# Patient Record
Sex: FEMALE | Race: WHITE
[De-identification: names, ages, dates, MRNs, and addresses within clinical notes are randomized per-mention and may not be internally consistent; named-entity substitution may affect disease eponyms.]

---

## 2018-12-26 ENCOUNTER — HOSPITAL ENCOUNTER (INPATIENT)
Dept: HOSPITAL 61 - 1 WEST ICU | Age: 58
LOS: 7 days | Discharge: NURSING, REHAB OR LONG-TERM CARE | DRG: 871 | End: 2019-01-02
Payer: MEDICAID

## 2018-12-26 VITALS — DIASTOLIC BLOOD PRESSURE: 68 MMHG | SYSTOLIC BLOOD PRESSURE: 85 MMHG

## 2018-12-26 VITALS — SYSTOLIC BLOOD PRESSURE: 100 MMHG | DIASTOLIC BLOOD PRESSURE: 60 MMHG

## 2018-12-26 VITALS — DIASTOLIC BLOOD PRESSURE: 65 MMHG | SYSTOLIC BLOOD PRESSURE: 100 MMHG

## 2018-12-26 VITALS — DIASTOLIC BLOOD PRESSURE: 60 MMHG | SYSTOLIC BLOOD PRESSURE: 101 MMHG

## 2018-12-26 VITALS — BODY MASS INDEX: 22.83 KG/M2 | HEIGHT: 62 IN | WEIGHT: 124.05 LBS

## 2018-12-26 VITALS — SYSTOLIC BLOOD PRESSURE: 96 MMHG | DIASTOLIC BLOOD PRESSURE: 58 MMHG

## 2018-12-26 DIAGNOSIS — J96.22: ICD-10-CM

## 2018-12-26 DIAGNOSIS — J44.1: ICD-10-CM

## 2018-12-26 DIAGNOSIS — E87.1: ICD-10-CM

## 2018-12-26 DIAGNOSIS — F41.9: ICD-10-CM

## 2018-12-26 DIAGNOSIS — E43: ICD-10-CM

## 2018-12-26 DIAGNOSIS — J96.21: ICD-10-CM

## 2018-12-26 DIAGNOSIS — I10: ICD-10-CM

## 2018-12-26 DIAGNOSIS — Z91.19: ICD-10-CM

## 2018-12-26 DIAGNOSIS — F17.210: ICD-10-CM

## 2018-12-26 DIAGNOSIS — A41.9: Primary | ICD-10-CM

## 2018-12-26 LAB
BASE EXCESS ABG: 22 MMOL/L (ref -3–3)
HCO3 BLDA-SCNC: 55 MMOL/L (ref 21–28)
INSPIRATION SETTING TIME VENT: 45
PCO2 BLDA: 126 MMHG (ref 35–46)
PO2 BLDA: 77 MMHG (ref 65–108)
SAO2 % BLDA: 94 % (ref 92–99)

## 2018-12-26 PROCEDURE — 80053 COMPREHEN METABOLIC PANEL: CPT

## 2018-12-26 PROCEDURE — 87493 C DIFF AMPLIFIED PROBE: CPT

## 2018-12-26 PROCEDURE — 87641 MR-STAPH DNA AMP PROBE: CPT

## 2018-12-26 PROCEDURE — 36415 COLL VENOUS BLD VENIPUNCTURE: CPT

## 2018-12-26 PROCEDURE — 94660 CPAP INITIATION&MGMT: CPT

## 2018-12-26 PROCEDURE — 93005 ELECTROCARDIOGRAM TRACING: CPT

## 2018-12-26 PROCEDURE — 36600 WITHDRAWAL OF ARTERIAL BLOOD: CPT

## 2018-12-26 PROCEDURE — 99406 BEHAV CHNG SMOKING 3-10 MIN: CPT

## 2018-12-26 PROCEDURE — 83735 ASSAY OF MAGNESIUM: CPT

## 2018-12-26 PROCEDURE — 94640 AIRWAY INHALATION TREATMENT: CPT

## 2018-12-26 PROCEDURE — 82805 BLOOD GASES W/O2 SATURATION: CPT

## 2018-12-26 PROCEDURE — 71045 X-RAY EXAM CHEST 1 VIEW: CPT

## 2018-12-26 PROCEDURE — 94760 N-INVAS EAR/PLS OXIMETRY 1: CPT

## 2018-12-26 PROCEDURE — 85027 COMPLETE CBC AUTOMATED: CPT

## 2018-12-26 PROCEDURE — 5A09357 ASSISTANCE WITH RESPIRATORY VENTILATION, LESS THAN 24 CONSECUTIVE HOURS, CONTINUOUS POSITIVE AIRWAY PRESSURE: ICD-10-PCS

## 2018-12-26 PROCEDURE — 71250 CT THORAX DX C-: CPT

## 2018-12-26 RX ADMIN — METHYLPREDNISOLONE SODIUM SUCCINATE SCH MG: 40 INJECTION, POWDER, FOR SOLUTION INTRAMUSCULAR; INTRAVENOUS at 22:44

## 2018-12-26 RX ADMIN — HEPARIN SODIUM SCH UNIT: 5000 INJECTION, SOLUTION INTRAVENOUS; SUBCUTANEOUS at 22:45

## 2018-12-26 RX ADMIN — IPRATROPIUM BROMIDE AND ALBUTEROL SULFATE SCH ML: .5; 3 SOLUTION RESPIRATORY (INHALATION) at 23:00

## 2018-12-26 NOTE — NUR
Patient admitted to room 109 from [facility] at 2135.  Patient on 3LNC.  Moved self from EMS gurney 
to bed.  Monitor on, ST noted.  VSS.  Lung sounds with wheezing throughout.  Bipap 18/8, 24, 
45% placed on patient per order.  No c/o pain or discomfort at this time.  Call light is 
within reach.  Will monitor.

## 2018-12-26 NOTE — NUR
ABG obtained per Dr. Linda's order.  Dr. Llanes consulted and notified of ABG results and 
current Bipap settings.  An order was received to decrease the FIO2 to 40%.  On admission, 
patient was voicing her wish to not be intubated.  Dr. Llanes aware.  Will monitor.

## 2018-12-27 VITALS — SYSTOLIC BLOOD PRESSURE: 119 MMHG | DIASTOLIC BLOOD PRESSURE: 63 MMHG

## 2018-12-27 VITALS — DIASTOLIC BLOOD PRESSURE: 65 MMHG | SYSTOLIC BLOOD PRESSURE: 98 MMHG

## 2018-12-27 VITALS — SYSTOLIC BLOOD PRESSURE: 98 MMHG | DIASTOLIC BLOOD PRESSURE: 64 MMHG

## 2018-12-27 VITALS — SYSTOLIC BLOOD PRESSURE: 132 MMHG | DIASTOLIC BLOOD PRESSURE: 75 MMHG

## 2018-12-27 VITALS — SYSTOLIC BLOOD PRESSURE: 161 MMHG | DIASTOLIC BLOOD PRESSURE: 95 MMHG

## 2018-12-27 VITALS — SYSTOLIC BLOOD PRESSURE: 143 MMHG | DIASTOLIC BLOOD PRESSURE: 78 MMHG

## 2018-12-27 VITALS — SYSTOLIC BLOOD PRESSURE: 93 MMHG | DIASTOLIC BLOOD PRESSURE: 59 MMHG

## 2018-12-27 VITALS — DIASTOLIC BLOOD PRESSURE: 66 MMHG | SYSTOLIC BLOOD PRESSURE: 133 MMHG

## 2018-12-27 VITALS — SYSTOLIC BLOOD PRESSURE: 105 MMHG | DIASTOLIC BLOOD PRESSURE: 63 MMHG

## 2018-12-27 VITALS — DIASTOLIC BLOOD PRESSURE: 83 MMHG | SYSTOLIC BLOOD PRESSURE: 120 MMHG

## 2018-12-27 VITALS — DIASTOLIC BLOOD PRESSURE: 57 MMHG | SYSTOLIC BLOOD PRESSURE: 94 MMHG

## 2018-12-27 VITALS — SYSTOLIC BLOOD PRESSURE: 134 MMHG | DIASTOLIC BLOOD PRESSURE: 80 MMHG

## 2018-12-27 VITALS — DIASTOLIC BLOOD PRESSURE: 59 MMHG | SYSTOLIC BLOOD PRESSURE: 90 MMHG

## 2018-12-27 LAB
ALBUMIN SERPL-MCNC: 2.8 G/DL (ref 3.4–5)
ALBUMIN/GLOB SERPL: 0.8 {RATIO} (ref 1–1.7)
ALP SERPL-CCNC: 83 U/L (ref 46–116)
ALT SERPL-CCNC: 30 U/L (ref 14–59)
ANION GAP SERPL CALC-SCNC: (no result) MMOL/L (ref 6–14)
AST SERPL-CCNC: 18 U/L (ref 15–37)
BASE EXCESS ABG: 14 MMOL/L (ref -3–3)
BASE EXCESS ABG: 17 MMOL/L (ref -3–3)
BASE EXCESS ABG: 24 MMOL/L (ref -3–3)
BILIRUB SERPL-MCNC: 0.1 MG/DL (ref 0.2–1)
BUN SERPL-MCNC: 13 MG/DL (ref 7–20)
BUN/CREAT SERPL: 22 (ref 6–20)
CALCIUM SERPL-MCNC: 8.7 MG/DL (ref 8.5–10.1)
CHLORIDE SERPL-SCNC: 90 MMOL/L (ref 98–107)
CO2 SERPL-SCNC: > 45 MMOL/L (ref 21–32)
CREAT SERPL-MCNC: 0.6 MG/DL (ref 0.6–1)
ERYTHROCYTE [DISTWIDTH] IN BLOOD BY AUTOMATED COUNT: 13.8 % (ref 11.5–14.5)
GFR SERPLBLD BASED ON 1.73 SQ M-ARVRAT: 102.3 ML/MIN
GLOBULIN SER-MCNC: 3.6 G/DL (ref 2.2–3.8)
GLUCOSE SERPL-MCNC: 241 MG/DL (ref 70–99)
HCO3 BLDA-SCNC: 44 MMOL/L (ref 21–28)
HCO3 BLDA-SCNC: 48 MMOL/L (ref 21–28)
HCO3 BLDA-SCNC: 56 MMOL/L (ref 21–28)
HCT VFR BLD CALC: 35.5 % (ref 36–47)
HGB BLD-MCNC: 11.8 G/DL (ref 12–15.5)
INSPIRATION SETTING TIME VENT: 35
INSPIRATION SETTING TIME VENT: 40
INSPIRATION SETTING TIME VENT: 40
MCH RBC QN AUTO: 31 PG (ref 25–35)
MCHC RBC AUTO-ENTMCNC: 33 G/DL (ref 31–37)
MCV RBC AUTO: 92 FL (ref 79–100)
PCO2 BLDA: 100 MMHG (ref 35–46)
PCO2 BLDA: 117 MMHG (ref 35–46)
PCO2 BLDA: 83 MMHG (ref 35–46)
PLATELET # BLD AUTO: 276 X10^3/UL (ref 140–400)
PO2 BLDA: 51 MMHG (ref 65–108)
PO2 BLDA: 73 MMHG (ref 65–108)
PO2 BLDA: 86 MMHG (ref 65–108)
POTASSIUM SERPL-SCNC: 4.9 MMOL/L (ref 3.5–5.1)
PROT SERPL-MCNC: 6.4 G/DL (ref 6.4–8.2)
RBC # BLD AUTO: 3.86 X10^6/UL (ref 3.5–5.4)
SAO2 % BLDA: 84 % (ref 92–99)
SAO2 % BLDA: 94 % (ref 92–99)
SAO2 % BLDA: 97 % (ref 92–99)
SODIUM SERPL-SCNC: 134 MMOL/L (ref 136–145)
WBC # BLD AUTO: 9.7 X10^3/UL (ref 4–11)

## 2018-12-27 PROCEDURE — 5A09357 ASSISTANCE WITH RESPIRATORY VENTILATION, LESS THAN 24 CONSECUTIVE HOURS, CONTINUOUS POSITIVE AIRWAY PRESSURE: ICD-10-PCS

## 2018-12-27 RX ADMIN — METHYLPREDNISOLONE SODIUM SUCCINATE SCH MG: 40 INJECTION, POWDER, FOR SOLUTION INTRAMUSCULAR; INTRAVENOUS at 15:58

## 2018-12-27 RX ADMIN — IPRATROPIUM BROMIDE AND ALBUTEROL SULFATE SCH ML: .5; 3 SOLUTION RESPIRATORY (INHALATION) at 20:13

## 2018-12-27 RX ADMIN — IPRATROPIUM BROMIDE AND ALBUTEROL SULFATE SCH ML: .5; 3 SOLUTION RESPIRATORY (INHALATION) at 12:00

## 2018-12-27 RX ADMIN — HEPARIN SODIUM SCH UNIT: 5000 INJECTION, SOLUTION INTRAVENOUS; SUBCUTANEOUS at 15:59

## 2018-12-27 RX ADMIN — METHYLPREDNISOLONE SODIUM SUCCINATE SCH MG: 40 INJECTION, POWDER, FOR SOLUTION INTRAMUSCULAR; INTRAVENOUS at 22:00

## 2018-12-27 RX ADMIN — IPRATROPIUM BROMIDE AND ALBUTEROL SULFATE SCH ML: .5; 3 SOLUTION RESPIRATORY (INHALATION) at 15:24

## 2018-12-27 RX ADMIN — IPRATROPIUM BROMIDE AND ALBUTEROL SULFATE SCH ML: .5; 3 SOLUTION RESPIRATORY (INHALATION) at 07:34

## 2018-12-27 RX ADMIN — METHYLPREDNISOLONE SODIUM SUCCINATE SCH MG: 40 INJECTION, POWDER, FOR SOLUTION INTRAMUSCULAR; INTRAVENOUS at 06:41

## 2018-12-27 RX ADMIN — HEPARIN SODIUM SCH UNIT: 5000 INJECTION, SOLUTION INTRAVENOUS; SUBCUTANEOUS at 22:00

## 2018-12-27 RX ADMIN — HEPARIN SODIUM SCH UNIT: 5000 INJECTION, SOLUTION INTRAVENOUS; SUBCUTANEOUS at 06:45

## 2018-12-27 NOTE — NUR
Nursing Note

Pt only took half of given dose of Zyprexa, rest was dumped on ground, refusing any more 
medications.  Explained to pt the need to wear the BiPAP for her elevated CO2, pt denies 
this as an issue, states "No one told me that" and that "no one has given me any numbers on 
it".  Told the pt the last ABG showed a CO2 of 116, higher than the prior one because she 
kept the BiPAP off, pt states "that's not possible and I only had it off from 11 to 3", I 
further explained this issue to the pt.  Discussed the indications of not improving her 
respiratory function and the possibility of being intubated.  Was told by day shift that pt 
is OK to be intubated "If I have to", reaffirmed this with pt at this time.  Explained that 
intubation is a last resort, that her work of breathing is getting worse off BiPAP, but that 
we would wear the BiPAP and see if we can avoid intubation as BiPAP is less invasive.  Pt 
now back on BiPAP.

## 2018-12-27 NOTE — PDOC
PULMONARY PROGRESS NOTES


Vitals





Vital Signs








  Date Time  Temp Pulse Resp B/P (MAP) Pulse Ox O2 Delivery O2 Flow Rate FiO2


 


12/27/18 10:00  108 24 133/66 (88) 98 Nasal Cannula 3.0 


 


12/27/18 07:00 98.6       





 98.6       








General:  Alert


Lungs:  Other


Cardiovascular:  S1, S2


Abdomen:  Soft, Non-tender


Extremities:  No Edema


Labs





Laboratory Tests








Test


 12/26/18


22:30 12/27/18


02:12 12/27/18


07:45 12/27/18


09:51


 


O2 Saturation 94 % (92-99)  94 % (92-99)  97 % (92-99)  


 


Arterial Blood pH


 7.26


(7.35-7.45) 7.30


(7.35-7.45) 7.34


(7.35-7.45) 





 


Arterial Blood pCO2 at


Patient Temp 126 mmHg


(35-46) 100 mmHg


(35-46) 83 mmHg


(35-46) 





 


Arterial Blood pO2 at Patient


Temp 77 mmHg


() 73 mmHg


() 86 mmHg


() 





 


Arterial Blood HCO3


 55 mmol/L


(21-28) 48 mmol/L


(21-28) 44 mmol/L


(21-28) 





 


Arterial Blood Base Excess


 22 mmol/L


(-3-3) 17 mmol/L


(-3-3) 14 mmol/L


(-3-3) 





 


FiO2 45  40  40  


 


White Blood Count


 


 


 


 9.7 x10^3/uL


(4.0-11.0)


 


Red Blood Count


 


 


 


 3.86 x10^6/uL


(3.50-5.40)


 


Hemoglobin


 


 


 


 11.8 g/dL


(12.0-15.5)


 


Hematocrit


 


 


 


 35.5 %


(36.0-47.0)


 


Mean Corpuscular Volume    92 fL () 


 


Mean Corpuscular Hemoglobin    31 pg (25-35) 


 


Mean Corpuscular Hemoglobin


Concent 


 


 


 33 g/dL


(31-37)


 


Red Cell Distribution Width


 


 


 


 13.8 %


(11.5-14.5)


 


Platelet Count


 


 


 


 276 x10^3/uL


(140-400)


 


Sodium Level


 


 


 


 134 mmol/L


(136-145)


 


Potassium Level


 


 


 


 4.9 mmol/L


(3.5-5.1)


 


Chloride Level


 


 


 


 90 mmol/L


()


 


Carbon Dioxide Level


 


 


 


 > 45 mmol/L


(21-32)


 


Anion Gap     (6-14) 


 


Blood Urea Nitrogen


 


 


 


 13 mg/dL


(7-20)


 


Creatinine


 


 


 


 0.6 mg/dL


(0.6-1.0)


 


Estimated GFR


(Cockcroft-Gault) 


 


 


 102.3 





 


BUN/Creatinine Ratio    22 (6-20) 


 


Glucose Level


 


 


 


 241 mg/dL


(70-99)


 


Calcium Level


 


 


 


 8.7 mg/dL


(8.5-10.1)


 


Total Bilirubin


 


 


 


 0.1 mg/dL


(0.2-1.0)


 


Aspartate Amino Transf


(AST/SGOT) 


 


 


 18 U/L (15-37) 





 


Alanine Aminotransferase


(ALT/SGPT) 


 


 


 30 U/L (14-59) 





 


Alkaline Phosphatase


 


 


 


 83 U/L


()


 


Total Protein


 


 


 


 6.4 g/dL


(6.4-8.2)


 


Albumin


 


 


 


 2.8 g/dL


(3.4-5.0)


 


Albumin/Globulin Ratio    0.8 (1.0-1.7) 








Laboratory Tests








Test


 12/26/18


22:30 12/27/18


02:12 12/27/18


07:45 12/27/18


09:51


 


O2 Saturation 94 % (92-99)  94 % (92-99)  97 % (92-99)  


 


Arterial Blood pH


 7.26


(7.35-7.45) 7.30


(7.35-7.45) 7.34


(7.35-7.45) 





 


Arterial Blood pCO2 at


Patient Temp 126 mmHg


(35-46) 100 mmHg


(35-46) 83 mmHg


(35-46) 





 


Arterial Blood pO2 at Patient


Temp 77 mmHg


() 73 mmHg


() 86 mmHg


() 





 


Arterial Blood HCO3


 55 mmol/L


(21-28) 48 mmol/L


(21-28) 44 mmol/L


(21-28) 





 


Arterial Blood Base Excess


 22 mmol/L


(-3-3) 17 mmol/L


(-3-3) 14 mmol/L


(-3-3) 





 


FiO2 45  40  40  


 


White Blood Count


 


 


 


 9.7 x10^3/uL


(4.0-11.0)


 


Red Blood Count


 


 


 


 3.86 x10^6/uL


(3.50-5.40)


 


Hemoglobin


 


 


 


 11.8 g/dL


(12.0-15.5)


 


Hematocrit


 


 


 


 35.5 %


(36.0-47.0)


 


Mean Corpuscular Volume    92 fL () 


 


Mean Corpuscular Hemoglobin    31 pg (25-35) 


 


Mean Corpuscular Hemoglobin


Concent 


 


 


 33 g/dL


(31-37)


 


Red Cell Distribution Width


 


 


 


 13.8 %


(11.5-14.5)


 


Platelet Count


 


 


 


 276 x10^3/uL


(140-400)


 


Sodium Level


 


 


 


 134 mmol/L


(136-145)


 


Potassium Level


 


 


 


 4.9 mmol/L


(3.5-5.1)


 


Chloride Level


 


 


 


 90 mmol/L


()


 


Carbon Dioxide Level


 


 


 


 > 45 mmol/L


(21-32)


 


Anion Gap     (6-14) 


 


Blood Urea Nitrogen


 


 


 


 13 mg/dL


(7-20)


 


Creatinine


 


 


 


 0.6 mg/dL


(0.6-1.0)


 


Estimated GFR


(Cockcroft-Gault) 


 


 


 102.3 





 


BUN/Creatinine Ratio    22 (6-20) 


 


Glucose Level


 


 


 


 241 mg/dL


(70-99)


 


Calcium Level


 


 


 


 8.7 mg/dL


(8.5-10.1)


 


Total Bilirubin


 


 


 


 0.1 mg/dL


(0.2-1.0)


 


Aspartate Amino Transf


(AST/SGOT) 


 


 


 18 U/L (15-37) 





 


Alanine Aminotransferase


(ALT/SGPT) 


 


 


 30 U/L (14-59) 





 


Alkaline Phosphatase


 


 


 


 83 U/L


()


 


Total Protein


 


 


 


 6.4 g/dL


(6.4-8.2)


 


Albumin


 


 


 


 2.8 g/dL


(3.4-5.0)


 


Albumin/Globulin Ratio    0.8 (1.0-1.7) 








Medications





Active Scripts








 Medications  Dose


 Route/Sig


 Max Daily Dose Days Date Category


 


 Protonix


  (Pantoprazole


 Sodium) 20 Mg


 Tablet.dr  40 Mg


 PO DAILY


   12/26/18 Reported


 


 Xopenex


  (Levalbuterol


 Hcl) 1.25 Mg/3 Ml


 Vial.neb  1 Vial


 NEB QID PRN


   12/26/18 Reported


 


 Amlodipine


 Besylate 2.5 Mg


 Tablet  2.5 Mg


 PO DAILY


   12/26/18 Reported











Impression


.


A/C HYPERCAPNIA


ANXIETY AECOPD


SEE ORDERS


 THANKS











RAYO ROBERT MD Dec 27, 2018 11:51

## 2018-12-27 NOTE — RAD
Portable chest, 12/27/2018:

 

HISTORY: Respiratory distress

 

Comparison is made to a study from 11/5/2018. The patient is rotated to 

the right. The heart size is normal. There is calcific plaquing of the 

aorta. There is unchanged prominence of the pulmonary markings, probably 

due to scarring. No new pulmonary abnormality is seen. There is no 

evidence of pleural fluid.

 

IMPRESSION:

1. Unchanged prominence of the pulmonary markings compatible with 

fibrosis.

2. No new cardiopulmonary abnormality is detected.

 

Electronically signed by: Rick Moritz, MD (12/27/2018 7:40 AM) [facility]

## 2018-12-27 NOTE — HP
ADMIT DATE:  12/26/2018



HISTORY OF PRESENT ILLNESS:  The patient is a 58-year-old  female

patient who was transferred from [facility] where she came to the

Emergency Room with shortness of breath.  The patient's daughter called

emergency medical service personnel and she reported to have an oxygen

saturation of only 68% at home.  The emergency medical service personnel found

her to be at 90% on 6 liters.  She is supposed to be on oxygen.  She was

discharged from [facility] on Sunday, 2 days ago.  She apparently

did not fill her prescription due to holiday.  Her daughter stated that only

discharge medication was for hypertension.  After the patient got home, she was

doing okay on her 3 liters of oxygen and on the night of admission to [facility], she started increasing her oxygen demand and she was also noted to be

more confused as to whether the patient's ankle swelling is worse or the same as

usual.  She denied any chills, rigors, or fever.  Denied any chest pain.  She

was extensively investigated in the Emergency Room.  She had labs showed that

her white cell count of 15,800.  Her chemistry showed that she has hyponatremia

and her chest x-ray showed lungs that are free of infiltrate, heart is upper

limit of normal.  There is no effusion.  There has been mild improvement

compared to Saturday, it is a study from 11/25/2018.  The patient was admitted

with diagnosis of COPD exacerbation.  Unfortunately, they did not do any blood

gases.  By the time I saw her, her oxygen saturation was 97% on 2 liters of

oxygen by nasal cannula; however, she has prominent flapping tremors and we did

blood gases, which showed that her pH was 7.31, pCO2 of 95, pO2 was 83,

bicarbonate of 48 and oxygen saturation was 94% on FiO2 of 32%.  I have had a

lengthy discussion with her as to where do we go further from here because she

has to be on BiPAP machine and she might eventually ended up being intubated. 

Otherwise, her life expectancy is going to be very short.  She did agree to go

on BiPAP for a short period of time, then she discontinued it.  Her daughter

said that she would like her to be in a larger hospital with more specialists

and therefore we transferred her to the ICU of [facility] to

consult the pulmonologist.  In fact, by the time she arrived here last night,

her blood gases actually were worse, her pH was 7.26, pCO2 of 126, pO2 of 72,

bicarbonate was 55 and oxygen saturation was 94% on FiO2 of 45%.



PAST MEDICAL HISTORY:  Significant for chronic obstructive pulmonary disease,

chronic hypercapnic hypoxic respiratory failure, pulmonary hypertension, tobacco

use disorder, questionable borderline personality disorder, severe debility as

well as hypertension and noncompliance.



PAST SURGICAL HISTORY:  Unremarkable.



ALLERGIES:  THE PATIENT CLAIMS THAT SHE HAS MULTIPLE ALLERGIES INCLUDING ALLERGY

TO SULFA, CEFACLOR, CIPROFLOXACIN, DOXYCYCLINE, ERYTHROMYCIN, IODINE,

LISINOPRIL, METRONIDAZOLE AND TETRACYCLINE.



FAMILY HISTORY:  Unremarkable.



SOCIAL HISTORY:  She lives at home with her daughter.  She continues to smoke. 

She apparently smokes a pack a day, although she claims she smokes only few

cigarettes a day.  She does not drink alcohol or do any recreational drugs.  She

has been admitted numerous times; continue all this to be noncompliant with

medical advice.



REVIEW OF SYSTEMS:  As per history of present illness.



MEDICATIONS:  She was transferred from [facility] to continue on

following medications, she is on Xopenex 1.25 mg/3 mL by nebulizer every 4

hours, amlodipine 2.5 mg daily and Protonix 40 mg daily.



PHYSICAL EXAMINATION:

GENERAL:  On examining her today, she was resting slightly propped up in bed, in

no apparent distress.  The nursing staff stated that she became extremely short

of breath when she goes frequently to the bathroom, to use a bedpan and whenever

she tries to attempt to drop down her pants, she become extremely short of

breath and therefore, they took her pants off to preserve her energy.  There was

no pallor, jaundice, cyanosis, or thyromegaly.  No jugular venous distension. 

No lower limb edema.

VITAL SIGNS:  Her heart rate was 95, blood pressure was 90/59, temperature was

98.6, respiratory rate was 24, and oxygen saturation was 98% on 3 liters of

oxygen by nasal cannula.

HEAD, EYES, EARS, NOSE AND THROAT:  Showed normocephalic, atraumatic.

NECK:  Supple.

HEART:  Showed normal first and second heart sounds.  No gallop, rub or murmur.

CHEST:  Showed central trachea, equally reduced expansion, reduced air entry,

vesicular breath sounds, very few scattered rhonchi, could not appreciate any

crepitation.

ABDOMEN:  Slightly distended, soft, nontender.

NEUROLOGIC:  She is awake, alert.  All her cranial nerves intact.  She moves

extremities without difficulty.



LABORATORY DATA:  Her most recent blood gases as of this morning showed that her

pH was 7.34, pCO2 of 83, pO2 of 86, bicarbonate 44, and oxygen saturation was

97% on FiO2 of 40%.



ASSESSMENT AND PLAN:  My plan is to consult the pulmonologist to device a

strategy to help this patient who is extremely noncompliant.  I do not know

whether perhaps a tracheostomy and being on a ventilator is other available

solution as the patient has been admitted in and out of the hospital with acute

hypercapnic hypoxic respiratory failure on numerous occasions.  She does not

want to be intubated, although her daughter want her to be intubated if need be.

 I spoke with the Nadege Molina to discuss with the family to see exactly what

is a long-term goal of treatment.

 



______________________________

ALEAH KELLY MD



DR:  ALANNA/katharine  JOB#:  [id number] / [id number]

DD:  12/27/2018 10:21  DT:  12/27/2018 11:43

## 2018-12-27 NOTE — PDOC2
PALLIATIVE CARE


Palliative Care Note


Palliative Care 


Consult requested by Dr. Linda to address goals of care


Medical assessment per medical record; 


A/C Hypercapnia; anxiety, End stage COPD


Patient is not able to clearly verbalize her medical condition. Believes the 

green oxygen tubing is what is causing her difficulty in breathing. 


Spoke with daughter Lolly--youngest daughter.  Reviewed current medical 

condition.  Family has been trying to obtain POA but patient is inconsistent 

about who she would want to make her decisions.


Discussed option for [facility].  





Dr. Llanes did discuss with patient and Lolly  her wishes for intubation.   

Patient would want to be intubated.  Decision was made to intubate if needed 

but would be followed with more discussion and likely withdraw ventilator if 

patient was not able to be extubated. 





Lolly is trying to reach her sister and Aunt for 1700 meeting today. 


1700 family meeting cancelled per Lolly.  Felt that they had information needed.











STEPHEN SANTIAGO Dec 27, 2018 12:35

## 2018-12-27 NOTE — RAD
Examination: CT CHEST WO CONTRAST

 

History: SOA, ILD, NO PRIORS

 

Comparison/Correlation: 12/27/2018 AP view of the chest

 

Findings: Axial images of the chest were obtained without contrast. 

Sagittal and coronal reformatted images provided.

 

Relatively narrowed right and left main bronchi are noted diffusely. 

Narrowing of the right lower lobe bronchus is also seen.

 

Emphysematous involvement of the lung fields is noted. Linear scarring or 

atelectasis involving posterior right mid thoracic level in basilar region

noted. Linear atelectasis or scarring involving the left lung base also is

present along the major fissure.

 

Visualized upper abdomen is unremarkable.

 

Impression:

Linear scarring or atelectasis involving the posterior right lung basilar 

region.

 

Diffuse emphysematous involvement of the lung fields.

 

No suspicious consolidations. No suspicious interstitial process.

 

Electronically signed by: Adam Leija MD (12/27/2018 3:49 PM) [id number]

## 2018-12-28 VITALS — DIASTOLIC BLOOD PRESSURE: 88 MMHG | SYSTOLIC BLOOD PRESSURE: 136 MMHG

## 2018-12-28 VITALS — SYSTOLIC BLOOD PRESSURE: 112 MMHG | DIASTOLIC BLOOD PRESSURE: 67 MMHG

## 2018-12-28 VITALS — DIASTOLIC BLOOD PRESSURE: 92 MMHG | SYSTOLIC BLOOD PRESSURE: 149 MMHG

## 2018-12-28 VITALS — SYSTOLIC BLOOD PRESSURE: 139 MMHG | DIASTOLIC BLOOD PRESSURE: 82 MMHG

## 2018-12-28 VITALS — SYSTOLIC BLOOD PRESSURE: 141 MMHG | DIASTOLIC BLOOD PRESSURE: 81 MMHG

## 2018-12-28 VITALS — SYSTOLIC BLOOD PRESSURE: 162 MMHG | DIASTOLIC BLOOD PRESSURE: 87 MMHG

## 2018-12-28 VITALS — SYSTOLIC BLOOD PRESSURE: 126 MMHG | DIASTOLIC BLOOD PRESSURE: 76 MMHG

## 2018-12-28 VITALS — SYSTOLIC BLOOD PRESSURE: 139 MMHG | DIASTOLIC BLOOD PRESSURE: 85 MMHG

## 2018-12-28 VITALS — DIASTOLIC BLOOD PRESSURE: 83 MMHG | SYSTOLIC BLOOD PRESSURE: 152 MMHG

## 2018-12-28 LAB
BASE EXCESS ABG: 20 MMOL/L (ref -3–3)
HCO3 BLDA-SCNC: 49 MMOL/L (ref 21–28)
INSPIRATION SETTING TIME VENT: 40
PCO2 BLDA: 87 MMHG (ref 35–46)
PO2 BLDA: 81 MMHG (ref 65–108)
SAO2 % BLDA: 95 % (ref 92–99)

## 2018-12-28 PROCEDURE — 5A09357 ASSISTANCE WITH RESPIRATORY VENTILATION, LESS THAN 24 CONSECUTIVE HOURS, CONTINUOUS POSITIVE AIRWAY PRESSURE: ICD-10-PCS

## 2018-12-28 RX ADMIN — METHYLPREDNISOLONE SODIUM SUCCINATE SCH MG: 40 INJECTION, POWDER, FOR SOLUTION INTRAMUSCULAR; INTRAVENOUS at 20:54

## 2018-12-28 RX ADMIN — METHYLPREDNISOLONE SODIUM SUCCINATE SCH MG: 40 INJECTION, POWDER, FOR SOLUTION INTRAMUSCULAR; INTRAVENOUS at 14:03

## 2018-12-28 RX ADMIN — HEPARIN SODIUM SCH UNIT: 5000 INJECTION, SOLUTION INTRAVENOUS; SUBCUTANEOUS at 20:55

## 2018-12-28 RX ADMIN — IPRATROPIUM BROMIDE AND ALBUTEROL SULFATE SCH ML: .5; 3 SOLUTION RESPIRATORY (INHALATION) at 11:50

## 2018-12-28 RX ADMIN — METHYLPREDNISOLONE SODIUM SUCCINATE SCH MG: 40 INJECTION, POWDER, FOR SOLUTION INTRAMUSCULAR; INTRAVENOUS at 06:00

## 2018-12-28 RX ADMIN — HEPARIN SODIUM SCH UNIT: 5000 INJECTION, SOLUTION INTRAVENOUS; SUBCUTANEOUS at 06:00

## 2018-12-28 RX ADMIN — IPRATROPIUM BROMIDE AND ALBUTEROL SULFATE SCH ML: .5; 3 SOLUTION RESPIRATORY (INHALATION) at 15:52

## 2018-12-28 RX ADMIN — HEPARIN SODIUM SCH UNIT: 5000 INJECTION, SOLUTION INTRAVENOUS; SUBCUTANEOUS at 14:00

## 2018-12-28 RX ADMIN — IPRATROPIUM BROMIDE AND ALBUTEROL SULFATE SCH ML: .5; 3 SOLUTION RESPIRATORY (INHALATION) at 07:38

## 2018-12-28 RX ADMIN — IPRATROPIUM BROMIDE AND ALBUTEROL SULFATE SCH ML: .5; 3 SOLUTION RESPIRATORY (INHALATION) at 20:09

## 2018-12-28 NOTE — PDOC2
PALLIATIVE CARE


Palliative Care Note


Palliative Care


Patient asleep.  On BiPap


No family at bedside.  pCO2 80's. 





Code Status:  Full Code. 





Will follow as needed.











STEPHEN SANTIAGO Dec 28, 2018 14:55

## 2018-12-28 NOTE — CONS
DATE OF CONSULTATION:  12/27/2018



ATTENDING PHYSICIAN:  Dr. Linda.



REASON FOR CONSULTATION:  The patient seen in pulmonary consultation at the

request of Dr. Linda for acute on chronic hypoxemic hypercapnic respiratory

failure.  Initial pH 7.26, paCO2 of 126, paO2 of 77.



HISTORY OF PRESENT ILLNESS:  The patient is a 58-year-old female known to me

from previous hospitalization with severe anxiety, severe COPD, chronic

hypercapnic respiratory failure presented with increasing shortness of breath. 

Apparently, she was at [facility] up in [place] and she was

discharged.  She continues to smoke on and off.  She has a cough, mostly

nonproductive.  She denies fever, chills, nausea, vomiting.  Her x-ray was

reviewed.  There was some chronic interstitial markings.  No new infiltrates and

consolidation.  The patient denies hemoptysis.



PAST MEDICAL HISTORY:  Chronic respiratory failure, tobacco dependence, chronic

bronchitis, severe COPD, hypertension.



PAST SURGICAL HISTORY:  Previous lung infection.  She has had thoracentesis and

possibly a parapneumonic effusion.



FAMILY HISTORY:  Unknown.



SOCIAL HISTORY:  She smokes.



REVIEW OF SYSTEMS:  As indicated above, otherwise, a 10-point system was

reviewed and negative.



CURRENT MEDICATIONS:  List was reviewed.



ALLERGIES:  SHE HAS MULTIPLE ALLERGIES, PLEASE SEE THE LIST.



PHYSICAL EXAMINATION:

VITAL SIGNS:  Stable.  She is currently on 3 liters of oxygen supplementation.

GENERAL:  Awake, alert, following commands.

HEENT:  Eyes, the sclerae were nonicteric.

NECK:  Jugular venous distention was not elevated.  No lymphadenopathy.

CHEST:  Hyperinflation, barrel chest.

LUNGS:  Poor airway flow with no wheezes.

CARDIOVASCULAR:  Regular rate and rhythm with S1, S2.  No S3.

ABDOMEN:  Soft, nontender, nondistended.

EXTREMITIES:  No clubbing, cyanosis or edema.

NEUROLOGIC:  The patient was awake, alert, following commands.  A detailed neuro

exam was not performed.



LABORATORY DATA:  Reviewed.  White count was normal.  Hemoglobin and hematocrit

were noted.



IMPRESSION:

1.  Acute on chronic hypercapnic hypoxemic respiratory failure.

2.  Acute exacerbation of chronic obstructive pulmonary disease.

3.  Tobacco dependence.

4.  Multiple allergies.

5.  Severe anxiety disorder, nonspecific.

6.  Hypertension.



PLAN:

1.  Continue current p.r.n. and at bedtime BiPAP.

2.  Decrease O2 to lowest possible saturation maintained above 88%.

3.  I had a discussion with the patient and her daughter who works at [employer]. 

The patient wishes to be intubated for a short period of time.  If she does not

improve, she wishes to be taken off mechanical support and allow natural death

after approximately 7-10 days of mechanical support.



Dr. Linda, I do appreciate the privilege in sharing in the patient's care.

 



______________________________

RAYO ROBERT MD



DR:  LEIDY/nts  JOB#:  [id number] / [id number]

DD:  12/27/2018 11:46  DT:  12/28/2018 00:06

## 2018-12-28 NOTE — PDOC
PROGRESS NOTES


Subjective


Subjective


continues to desturate when she takes her oxygen off


still believes that all her symptoms are related to allergy 


Asking if any diet can alter and improves her condition





Objective


Objective





Vital Signs








  Date Time  Temp Pulse Resp B/P (MAP) Pulse Ox O2 Delivery O2 Flow Rate FiO2


 


12/28/18 08:02     97 Nasal Cannula 3.0 


 


12/28/18 03:52 95.0 107 30 149/92 (111)    





 95.0       














Intake and Output 


 


 12/28/18





 07:01


 


Intake Total 600 ml


 


Output Total 1250 ml


 


Balance -650 ml


 


 


 


Intake Oral 600 ml


 


Output Urine Total 1250 ml


 


# Bowel Movements 2











Physical Exam


Physical Exam


Resting propped up in bed in NAD


Vital signs ate stable


chest reduced air entry few scattered rhichi


Lungs:  Other





Diagnosis


COPD:  Acute on Chronic


RESPIRATORY FAILURE:  Acute on Chronic


HYPERTENSION:  Benign hypertension





Assessment


Assessment


acute on chronic hypercapnic respiratory failure


COPD Exacerbation


Hypertension


Non compliance





Plan


Plan of Care


To continue with steroids and bronchodilators 


May eventually require intubation and perhaps tracheostomy





Comment


Review of Relevant


I have reviewed the following items tamie (where applicable) has been applied.


Labs





Laboratory Tests








Test


 12/26/18


21:45 12/26/18


22:30 12/27/18


02:12 12/27/18


07:45


 


Nasal Screen MRSA (PCR)


 Negative


(Negative) 


 


 





 


O2 Saturation  94 % (92-99)  94 % (92-99)  97 % (92-99) 


 


Arterial Blood pH


 


 7.26


(7.35-7.45) 7.30


(7.35-7.45) 7.34


(7.35-7.45)


 


Arterial Blood pCO2 at


Patient Temp 


 126 mmHg


(35-46) 100 mmHg


(35-46) 83 mmHg


(35-46)


 


Arterial Blood pO2 at Patient


Temp 


 77 mmHg


() 73 mmHg


() 86 mmHg


()


 


Arterial Blood HCO3


 


 55 mmol/L


(21-28) 48 mmol/L


(21-28) 44 mmol/L


(21-28)


 


Arterial Blood Base Excess


 


 22 mmol/L


(-3-3) 17 mmol/L


(-3-3) 14 mmol/L


(-3-3)


 


FiO2  45  40  40 


 


Test


 12/27/18


09:51 12/27/18


16:52 12/28/18


07:45 





 


White Blood Count


 9.7 x10^3/uL


(4.0-11.0) 


 


 





 


Red Blood Count


 3.86 x10^6/uL


(3.50-5.40) 


 


 





 


Hemoglobin


 11.8 g/dL


(12.0-15.5) 


 


 





 


Hematocrit


 35.5 %


(36.0-47.0) 


 


 





 


Mean Corpuscular Volume 92 fL ()    


 


Mean Corpuscular Hemoglobin 31 pg (25-35)    


 


Mean Corpuscular Hemoglobin


Concent 33 g/dL


(31-37) 


 


 





 


Red Cell Distribution Width


 13.8 %


(11.5-14.5) 


 


 





 


Platelet Count


 276 x10^3/uL


(140-400) 


 


 





 


Sodium Level


 134 mmol/L


(136-145) 


 


 





 


Potassium Level


 4.9 mmol/L


(3.5-5.1) 


 


 





 


Chloride Level


 90 mmol/L


() 


 


 





 


Carbon Dioxide Level


 > 45 mmol/L


(21-32) 


 


 





 


Anion Gap  (6-14)    


 


Blood Urea Nitrogen


 13 mg/dL


(7-20) 


 


 





 


Creatinine


 0.6 mg/dL


(0.6-1.0) 


 


 





 


Estimated GFR


(Cockcroft-Gault) 102.3 


 


 


 





 


BUN/Creatinine Ratio 22 (6-20)    


 


Glucose Level


 241 mg/dL


(70-99) 


 


 





 


Calcium Level


 8.7 mg/dL


(8.5-10.1) 


 


 





 


Total Bilirubin


 0.1 mg/dL


(0.2-1.0) 


 


 





 


Aspartate Amino Transf


(AST/SGOT) 18 U/L (15-37) 


 


 


 





 


Alanine Aminotransferase


(ALT/SGPT) 30 U/L (14-59) 


 


 


 





 


Alkaline Phosphatase


 83 U/L


() 


 


 





 


Total Protein


 6.4 g/dL


(6.4-8.2) 


 


 





 


Albumin


 2.8 g/dL


(3.4-5.0) 


 


 





 


Albumin/Globulin Ratio 0.8 (1.0-1.7)    


 


O2 Saturation  84 % (92-99)  95 % (92-99)  


 


Arterial Blood pH


 


 7.30


(7.35-7.45) 7.37


(7.35-7.45) 





 


Arterial Blood pCO2 at


Patient Temp 


 117 mmHg


(35-46) 87 mmHg


(35-46) 





 


Arterial Blood pO2 at Patient


Temp 


 51 mmHg


() 81 mmHg


() 





 


Arterial Blood HCO3


 


 56 mmol/L


(21-28) 49 mmol/L


(21-28) 





 


Arterial Blood Base Excess


 


 24 mmol/L


(-3-3) 20 mmol/L


(-3-3) 





 


FiO2  35  40  








Laboratory Tests








Test


 12/27/18


09:51 12/27/18


16:52 12/28/18


07:45


 


White Blood Count


 9.7 x10^3/uL


(4.0-11.0) 


 





 


Red Blood Count


 3.86 x10^6/uL


(3.50-5.40) 


 





 


Hemoglobin


 11.8 g/dL


(12.0-15.5) 


 





 


Hematocrit


 35.5 %


(36.0-47.0) 


 





 


Mean Corpuscular Volume 92 fL ()   


 


Mean Corpuscular Hemoglobin 31 pg (25-35)   


 


Mean Corpuscular Hemoglobin


Concent 33 g/dL


(31-37) 


 





 


Red Cell Distribution Width


 13.8 %


(11.5-14.5) 


 





 


Platelet Count


 276 x10^3/uL


(140-400) 


 





 


Sodium Level


 134 mmol/L


(136-145) 


 





 


Potassium Level


 4.9 mmol/L


(3.5-5.1) 


 





 


Chloride Level


 90 mmol/L


() 


 





 


Carbon Dioxide Level


 > 45 mmol/L


(21-32) 


 





 


Anion Gap  (6-14)   


 


Blood Urea Nitrogen


 13 mg/dL


(7-20) 


 





 


Creatinine


 0.6 mg/dL


(0.6-1.0) 


 





 


Estimated GFR


(Cockcroft-Gault) 102.3 


 


 





 


BUN/Creatinine Ratio 22 (6-20)   


 


Glucose Level


 241 mg/dL


(70-99) 


 





 


Calcium Level


 8.7 mg/dL


(8.5-10.1) 


 





 


Total Bilirubin


 0.1 mg/dL


(0.2-1.0) 


 





 


Aspartate Amino Transf


(AST/SGOT) 18 U/L (15-37) 


 


 





 


Alanine Aminotransferase


(ALT/SGPT) 30 U/L (14-59) 


 


 





 


Alkaline Phosphatase


 83 U/L


() 


 





 


Total Protein


 6.4 g/dL


(6.4-8.2) 


 





 


Albumin


 2.8 g/dL


(3.4-5.0) 


 





 


Albumin/Globulin Ratio 0.8 (1.0-1.7)   


 


O2 Saturation  84 % (92-99)  95 % (92-99) 


 


Arterial Blood pH


 


 7.30


(7.35-7.45) 7.37


(7.35-7.45)


 


Arterial Blood pCO2 at


Patient Temp 


 117 mmHg


(35-46) 87 mmHg


(35-46)


 


Arterial Blood pO2 at Patient


Temp 


 51 mmHg


() 81 mmHg


()


 


Arterial Blood HCO3


 


 56 mmol/L


(21-28) 49 mmol/L


(21-28)


 


Arterial Blood Base Excess


 


 24 mmol/L


(-3-3) 20 mmol/L


(-3-3)


 


FiO2  35  40 








Medications





Current Medications


Albuterol/ Ipratropium (Duoneb) 3 ml RTQID NEB  Last administered on 12/28/18at 

07:38;  Start 12/26/18 at 23:00


Methylprednisolone Sodium Succinate (SOLU-Medrol 40MG VIAL) 40 mg Q8HRS IV  

Last administered on 12/28/18at 06:00;  Start 12/26/18 at 22:00


Albuterol Sulfate (Ventolin Neb Soln) 2.5 mg PRN Q4HRS  PRN NEB SHORTNESS OF 

BREATH;  Start 12/26/18 at 22:00


Ondansetron HCl (Zofran) 4 mg PRN Q6HRS  PRN IV NAUSEA/VOMITING 1ST CHOICE;  

Start 12/26/18 at 22:00


Heparin Sodium (Porcine) (Heparin Sodium) 5,000 unit Q8HRS SQ  Last 

administered on 12/28/18at 06:00;  Start 12/26/18 at 22:00


Olanzapine (ZyPREXA) 2.5 mg PRN Q4HRS  PRN PO anxiety  Last administered on 12/ 28/18at 04:05;  Start 12/27/18 at 09:15


Lorazepam (Ativan) 0.5 mg PRN Q8HRS  PRN PO ANXIETY / AGITATION;  Start 12/27/ 18 at 12:00





Active Scripts


Active


Reported


Protonix (Pantoprazole Sodium) 20 Mg Tablet.dr 40 Mg PO DAILY


Xopenex (Levalbuterol Hcl) 1.25 Mg/3 Ml Vial.neb 1 Vial NEB QID PRN


Amlodipine Besylate 2.5 Mg Tablet 2.5 Mg PO DAILY


Vitals/I & O





Vital Sign - Last 24 Hours








 12/27/18 12/27/18 12/27/18 12/27/18





 09:00 10:00 12:00 12:00


 


Pulse 95 108  


 


Resp 24 24  


 


B/P (MAP) 90/59 (69) 133/66 (88)  


 


Pulse Ox 98 98  


 


O2 Delivery Nasal Cannula Nasal Cannula  Bi-pap


 


O2 Flow Rate 3.0 3.0 3.0 4.0





 12/27/18 12/27/18 12/27/18 12/27/18





 12:00 12:05 15:57 16:00


 


Temp 98.6   





 98.6   


 


Pulse 108   


 


B/P (MAP) 161/95 (117)   


 


Pulse Ox 98 91 94 


 


O2 Delivery  BiPAP/CPAP BiPAP/CPAP Bi-pap


 


O2 Flow Rate 4.0   4.0


 


    





    





 12/27/18 12/27/18 12/27/18 12/27/18





 16:00 17:05 18:42 20:00


 


Temp  98.6  





  98.6  


 


Pulse  95  


 


B/P (MAP)  133/66 (88)  


 


Pulse Ox  94 94 


 


O2 Delivery   BiPAP/CPAP Bi-pap


 


O2 Flow Rate 3.0 3.0  


 


    





    





 12/27/18 12/27/18 12/27/18 12/28/18





 20:00 20:16 23:18 00:00


 


Temp 98.5   





 98.5   


 


Pulse 104   


 


B/P (MAP) 132/75 (94)   


 


Pulse Ox 90 92 92 


 


O2 Delivery BiPAP/CPAP BiPAP/CPAP BiPAP/CPAP Bi-pap


 


O2 Flow Rate 3.0   


 


    





    





 12/28/18 12/28/18 12/28/18 12/28/18





 00:00 01:59 03:52 04:00


 


Temp 98.1  95.0 





 98.1  95.0 


 


Pulse 97  107 


 


Resp 23  30 


 


B/P (MAP) 112/67 (82)  149/92 (111) 


 


Pulse Ox 91 92 94 


 


O2 Delivery BiPAP/CPAP BiPAP/CPAP Nasal Cannula Bi-pap


 


O2 Flow Rate   6.0 


 


    





    





 12/28/18 12/28/18 12/28/18 





 05:22 07:39 08:02 


 


Pulse Ox 96 98 97 


 


O2 Delivery BiPAP/CPAP BiPAP/CPAP Nasal Cannula 


 


O2 Flow Rate   3.0 














Intake and Output   


 


 12/27/18 12/27/18 12/28/18





 15:01 23:01 07:01


 


Intake Total  0 ml 600 ml


 


Output Total  800 ml 450 ml


 


Balance  -800 ml 150 ml

















ALEAH KELLY MD Dec 28, 2018 08:35

## 2018-12-28 NOTE — PDOC
PULMONARY PROGRESS NOTES


Vitals





Vital Signs








  Date Time  Temp Pulse Resp B/P (MAP) Pulse Ox O2 Delivery O2 Flow Rate FiO2


 


12/28/18 08:02     97 Nasal Cannula 3.0 


 


12/28/18 03:52 95.0 107 30 149/92 (111)    





 95.0       








General:  Alert


Lungs:  Other


Cardiovascular:  S1, S2


Abdomen:  Soft, Non-tender


Extremities:  No Edema


Labs





Laboratory Tests








Test


 12/26/18


21:45 12/26/18


22:30 12/27/18


02:12 12/27/18


07:45


 


Nasal Screen MRSA (PCR)


 Negative


(Negative) 


 


 





 


O2 Saturation  94 % (92-99)  94 % (92-99)  97 % (92-99) 


 


Arterial Blood pH


 


 7.26


(7.35-7.45) 7.30


(7.35-7.45) 7.34


(7.35-7.45)


 


Arterial Blood pCO2 at


Patient Temp 


 126 mmHg


(35-46) 100 mmHg


(35-46) 83 mmHg


(35-46)


 


Arterial Blood pO2 at Patient


Temp 


 77 mmHg


() 73 mmHg


() 86 mmHg


()


 


Arterial Blood HCO3


 


 55 mmol/L


(21-28) 48 mmol/L


(21-28) 44 mmol/L


(21-28)


 


Arterial Blood Base Excess


 


 22 mmol/L


(-3-3) 17 mmol/L


(-3-3) 14 mmol/L


(-3-3)


 


FiO2  45  40  40 


 


Test


 12/27/18


09:51 12/27/18


16:52 12/28/18


07:45 





 


White Blood Count


 9.7 x10^3/uL


(4.0-11.0) 


 


 





 


Red Blood Count


 3.86 x10^6/uL


(3.50-5.40) 


 


 





 


Hemoglobin


 11.8 g/dL


(12.0-15.5) 


 


 





 


Hematocrit


 35.5 %


(36.0-47.0) 


 


 





 


Mean Corpuscular Volume 92 fL ()    


 


Mean Corpuscular Hemoglobin 31 pg (25-35)    


 


Mean Corpuscular Hemoglobin


Concent 33 g/dL


(31-37) 


 


 





 


Red Cell Distribution Width


 13.8 %


(11.5-14.5) 


 


 





 


Platelet Count


 276 x10^3/uL


(140-400) 


 


 





 


Sodium Level


 134 mmol/L


(136-145) 


 


 





 


Potassium Level


 4.9 mmol/L


(3.5-5.1) 


 


 





 


Chloride Level


 90 mmol/L


() 


 


 





 


Carbon Dioxide Level


 > 45 mmol/L


(21-32) 


 


 





 


Anion Gap  (6-14)    


 


Blood Urea Nitrogen


 13 mg/dL


(7-20) 


 


 





 


Creatinine


 0.6 mg/dL


(0.6-1.0) 


 


 





 


Estimated GFR


(Cockcroft-Gault) 102.3 


 


 


 





 


BUN/Creatinine Ratio 22 (6-20)    


 


Glucose Level


 241 mg/dL


(70-99) 


 


 





 


Calcium Level


 8.7 mg/dL


(8.5-10.1) 


 


 





 


Total Bilirubin


 0.1 mg/dL


(0.2-1.0) 


 


 





 


Aspartate Amino Transf


(AST/SGOT) 18 U/L (15-37) 


 


 


 





 


Alanine Aminotransferase


(ALT/SGPT) 30 U/L (14-59) 


 


 


 





 


Alkaline Phosphatase


 83 U/L


() 


 


 





 


Total Protein


 6.4 g/dL


(6.4-8.2) 


 


 





 


Albumin


 2.8 g/dL


(3.4-5.0) 


 


 





 


Albumin/Globulin Ratio 0.8 (1.0-1.7)    


 


O2 Saturation  84 % (92-99)  95 % (92-99)  


 


Arterial Blood pH


 


 7.30


(7.35-7.45) 7.37


(7.35-7.45) 





 


Arterial Blood pCO2 at


Patient Temp 


 117 mmHg


(35-46) 87 mmHg


(35-46) 





 


Arterial Blood pO2 at Patient


Temp 


 51 mmHg


() 81 mmHg


() 





 


Arterial Blood HCO3


 


 56 mmol/L


(21-28) 49 mmol/L


(21-28) 





 


Arterial Blood Base Excess


 


 24 mmol/L


(-3-3) 20 mmol/L


(-3-3) 





 


FiO2  35  40  








Laboratory Tests








Test


 12/27/18


16:52 12/28/18


07:45


 


O2 Saturation 84 % (92-99)  95 % (92-99) 


 


Arterial Blood pH


 7.30


(7.35-7.45) 7.37


(7.35-7.45)


 


Arterial Blood pCO2 at


Patient Temp 117 mmHg


(35-46) 87 mmHg


(35-46)


 


Arterial Blood pO2 at Patient


Temp 51 mmHg


() 81 mmHg


()


 


Arterial Blood HCO3


 56 mmol/L


(21-28) 49 mmol/L


(21-28)


 


Arterial Blood Base Excess


 24 mmol/L


(-3-3) 20 mmol/L


(-3-3)


 


FiO2 35  40 








Medications





Active Scripts








 Medications  Dose


 Route/Sig


 Max Daily Dose Days Date Category


 


 Protonix


  (Pantoprazole


 Sodium) 20 Mg


 Tablet.dr  40 Mg


 PO DAILY


   12/26/18 Reported


 


 Xopenex


  (Levalbuterol


 Hcl) 1.25 Mg/3 Ml


 Vial.neb  1 Vial


 NEB QID PRN


   12/26/18 Reported


 


 Amlodipine


 Besylate 2.5 Mg


 Tablet  2.5 Mg


 PO DAILY


   12/26/18 Reported











Impression


.


IMPRESSION:


1.  Acute on chronic hypercapnic hypoxemic respiratory failure.


2.  Acute exacerbation of chronic obstructive pulmonary disease.


3.  Tobacco dependence.


4.  Multiple allergies.


5.  Severe anxiety disorder, nonspecific.


6.  Hypertension.














CT CHEST


 Impression:


Linear scarring or atelectasis involving the posterior right lung basilar 


region.


 


Diffuse emphysematous involvement of the lung fields.


 


No suspicious consolidations. No suspicious interstitial process.





Plan


.





1. continue BIPAP, okay to discontinue IV feeding


2. reviewed CT Chest, no infiltrate, severe emphysema 


3. continue steroids, IV antibiotics, ABG noted better this morning











RAYO ROBERT MD Dec 28, 2018 10:28

## 2018-12-28 NOTE — NUR
SS following for discharge planning. SS reviewed pt chart. Pt is from home and on 3 liters 
of oxygen currently and has had one previous admission. Pt was previously on services with 
[facility], [phone]; fax [phone]. SS will continue to follow for 
discharge planning.

## 2018-12-29 VITALS — SYSTOLIC BLOOD PRESSURE: 128 MMHG | DIASTOLIC BLOOD PRESSURE: 100 MMHG

## 2018-12-29 VITALS — SYSTOLIC BLOOD PRESSURE: 83 MMHG | DIASTOLIC BLOOD PRESSURE: 65 MMHG

## 2018-12-29 VITALS — DIASTOLIC BLOOD PRESSURE: 76 MMHG | SYSTOLIC BLOOD PRESSURE: 116 MMHG

## 2018-12-29 VITALS — SYSTOLIC BLOOD PRESSURE: 107 MMHG | DIASTOLIC BLOOD PRESSURE: 95 MMHG

## 2018-12-29 VITALS — SYSTOLIC BLOOD PRESSURE: 124 MMHG | DIASTOLIC BLOOD PRESSURE: 70 MMHG

## 2018-12-29 VITALS — SYSTOLIC BLOOD PRESSURE: 105 MMHG | DIASTOLIC BLOOD PRESSURE: 73 MMHG

## 2018-12-29 VITALS — DIASTOLIC BLOOD PRESSURE: 72 MMHG | SYSTOLIC BLOOD PRESSURE: 108 MMHG

## 2018-12-29 VITALS — SYSTOLIC BLOOD PRESSURE: 99 MMHG | DIASTOLIC BLOOD PRESSURE: 73 MMHG

## 2018-12-29 VITALS — SYSTOLIC BLOOD PRESSURE: 164 MMHG | DIASTOLIC BLOOD PRESSURE: 95 MMHG

## 2018-12-29 VITALS — SYSTOLIC BLOOD PRESSURE: 93 MMHG | DIASTOLIC BLOOD PRESSURE: 58 MMHG

## 2018-12-29 VITALS — DIASTOLIC BLOOD PRESSURE: 73 MMHG | SYSTOLIC BLOOD PRESSURE: 115 MMHG

## 2018-12-29 VITALS — DIASTOLIC BLOOD PRESSURE: 90 MMHG | SYSTOLIC BLOOD PRESSURE: 141 MMHG

## 2018-12-29 VITALS — DIASTOLIC BLOOD PRESSURE: 67 MMHG | SYSTOLIC BLOOD PRESSURE: 85 MMHG

## 2018-12-29 VITALS — SYSTOLIC BLOOD PRESSURE: 109 MMHG | DIASTOLIC BLOOD PRESSURE: 67 MMHG

## 2018-12-29 VITALS — DIASTOLIC BLOOD PRESSURE: 97 MMHG | SYSTOLIC BLOOD PRESSURE: 120 MMHG

## 2018-12-29 VITALS — DIASTOLIC BLOOD PRESSURE: 61 MMHG | SYSTOLIC BLOOD PRESSURE: 96 MMHG

## 2018-12-29 LAB
BASE EXCESS ABG: 21 MMOL/L (ref -3–3)
BASE EXCESS STD BLDA CALC-SCNC: 23 MMOL/L (ref -3–3)
HCO3 BLDA-SCNC: 51 MMOL/L (ref 21–28)
HCO3 BLDA-SCNC: 60 MMOL/L (ref 21–28)
INSPIRATION SETTING TIME VENT: 30
METHGB MFR BLD: 0.6 % (ref 0–1.9)
OXYHGB MFR BLD: 93.7 %
PCO2 BLDA: 88 MMHG (ref 35–46)
PCO2 BLDA: > 155 MMHG (ref 35–46)
PO2 BLDA: 66 MMHG (ref 65–108)
PO2 BLDA: 92 MMHG (ref 65–108)
SAO2 % BLDA: 93 % (ref 92–99)
SAO2 % BLDA: 95 % (ref 92–99)

## 2018-12-29 PROCEDURE — 5A09357 ASSISTANCE WITH RESPIRATORY VENTILATION, LESS THAN 24 CONSECUTIVE HOURS, CONTINUOUS POSITIVE AIRWAY PRESSURE: ICD-10-PCS

## 2018-12-29 RX ADMIN — HEPARIN SODIUM SCH UNIT: 5000 INJECTION, SOLUTION INTRAVENOUS; SUBCUTANEOUS at 14:00

## 2018-12-29 RX ADMIN — IPRATROPIUM BROMIDE AND ALBUTEROL SULFATE SCH ML: .5; 3 SOLUTION RESPIRATORY (INHALATION) at 16:32

## 2018-12-29 RX ADMIN — METHYLPREDNISOLONE SODIUM SUCCINATE SCH MG: 40 INJECTION, POWDER, FOR SOLUTION INTRAMUSCULAR; INTRAVENOUS at 06:05

## 2018-12-29 RX ADMIN — HEPARIN SODIUM SCH UNIT: 5000 INJECTION, SOLUTION INTRAVENOUS; SUBCUTANEOUS at 22:00

## 2018-12-29 RX ADMIN — HEPARIN SODIUM SCH UNIT: 5000 INJECTION, SOLUTION INTRAVENOUS; SUBCUTANEOUS at 06:00

## 2018-12-29 RX ADMIN — IPRATROPIUM BROMIDE AND ALBUTEROL SULFATE SCH ML: .5; 3 SOLUTION RESPIRATORY (INHALATION) at 13:21

## 2018-12-29 RX ADMIN — IPRATROPIUM BROMIDE AND ALBUTEROL SULFATE SCH ML: .5; 3 SOLUTION RESPIRATORY (INHALATION) at 19:42

## 2018-12-29 RX ADMIN — METHYLPREDNISOLONE SODIUM SUCCINATE SCH MG: 40 INJECTION, POWDER, FOR SOLUTION INTRAMUSCULAR; INTRAVENOUS at 22:17

## 2018-12-29 RX ADMIN — METHYLPREDNISOLONE SODIUM SUCCINATE SCH MG: 40 INJECTION, POWDER, FOR SOLUTION INTRAMUSCULAR; INTRAVENOUS at 17:15

## 2018-12-29 RX ADMIN — IPRATROPIUM BROMIDE AND ALBUTEROL SULFATE SCH ML: .5; 3 SOLUTION RESPIRATORY (INHALATION) at 08:24

## 2018-12-29 NOTE — PDOC
PULMONARY PROGRESS NOTES


Subjective


PT ON BIPAP


OFF BIPAP DID NOT DO WELL ON NC


Vitals





Vital Signs








  Date Time  Temp Pulse Resp B/P (MAP) Pulse Ox O2 Delivery O2 Flow Rate FiO2


 


12/29/18 13:21     93 BiPAP/CPAP  


 


12/29/18 13:00  110 22 116/76 (89)    


 


12/29/18 12:00       3.5 


 


12/29/18 09:00 98.2       





 98.2       








General:  Alert


Lungs:  Clear


Cardiovascular:  S1, S2


Abdomen:  Soft, Non-tender


Neuro Exam:  Alert


Extremities:  No Edema


Skin:  Warm


Labs





Laboratory Tests








Test


 12/27/18


16:52 12/28/18


07:45 12/29/18


08:25 12/29/18


13:20


 


O2 Saturation 84 % (92-99)  95 % (92-99)  95 % (92-99)  93 % (92-99) 


 


Arterial Blood pH


 7.30


(7.35-7.45) 7.37


(7.35-7.45) 7.20


(7.35-7.45) 7.38


(7.35-7.45)


 


Arterial Blood pCO2 at


Patient Temp 117 mmHg


(35-46) 87 mmHg


(35-46) > 155 mmHg


(35-46) 88 mmHg


(35-46)


 


Arterial Blood pO2 at Patient


Temp 51 mmHg


() 81 mmHg


() 92 mmHg


() 66 mmHg


()


 


Arterial Blood HCO3


 56 mmol/L


(21-28) 49 mmol/L


(21-28) 60 mmol/L


(21-28) 51 mmol/L


(21-28)


 


Arterial Blood Base Excess


 24 mmol/L


(-3-3) 20 mmol/L


(-3-3) 23 mmol/L


(-3-3) 21 mmol/L


(-3-3)


 


FiO2 35  40  36  30 


 


Oxyhemoglobin   93.7 %  


 


Methemoglobin


 


 


 0.6 %


(0.0-1.9) 





 


Carbon Monoxide, Quantitative


 


 


 0.8 %


(0.0-1.9) 











Laboratory Tests








Test


 12/29/18


08:25 12/29/18


13:20


 


O2 Saturation 95 % (92-99)  93 % (92-99) 


 


Arterial Blood pH


 7.20


(7.35-7.45) 7.38


(7.35-7.45)


 


Arterial Blood pCO2 at


Patient Temp > 155 mmHg


(35-46) 88 mmHg


(35-46)


 


Arterial Blood pO2 at Patient


Temp 92 mmHg


() 66 mmHg


()


 


Arterial Blood HCO3


 60 mmol/L


(21-28) 51 mmol/L


(21-28)


 


Arterial Blood Base Excess


 23 mmol/L


(-3-3) 21 mmol/L


(-3-3)


 


Oxyhemoglobin 93.7 %  


 


Methemoglobin


 0.6 %


(0.0-1.9) 





 


Carbon Monoxide, Quantitative


 0.8 %


(0.0-1.9) 





 


FiO2 36  30 








Medications





Active Scripts








 Medications  Dose


 Route/Sig


 Max Daily Dose Days Date Category


 


 Protonix


  (Pantoprazole


 Sodium) 20 Mg


 Tablet.dr  40 Mg


 PO DAILY


   12/26/18 Reported


 


 Xopenex


  (Levalbuterol


 Hcl) 1.25 Mg/3 Ml


 Vial.neb  1 Vial


 NEB QID PRN


   12/26/18 Reported


 


 Amlodipine


 Besylate 2.5 Mg


 Tablet  2.5 Mg


 PO DAILY


   12/26/18 Reported











Impression


.


IMPRESSION:


1.  Acute on chronic hypercapnic hypoxemic respiratory failure.


2.  Acute exacerbation of chronic obstructive pulmonary disease.


3.  Tobacco dependence.


4.  Multiple allergies.


5.  Severe anxiety disorder, nonspecific.


6.  Hypertension.














CT CHEST


 Impression:


Linear scarring or atelectasis involving the posterior right lung basilar 


region.


 


Diffuse emphysematous involvement of the lung fields.


 


No suspicious consolidations. No suspicious interstitial process.





Plan


.


D/W RN


BIPAP OFF FOR FEEDINGS


PT NEEDS TIME TO IMPROVE


WILL ADD DOXY





1. continue BIPAP, okay to discontinue IV feeding


2. reviewed CT Chest, no infiltrate, severe emphysema 


3. continue steroids, IV antibiotics, ABG noted better this morning











RAYO ROBERT MD Dec 29, 2018 15:56

## 2018-12-29 NOTE — PN
DATE:  12/29/2018



SUBJECTIVE:  The patient is resting, slightly propped up in bed, sleeping

comfortably, in no apparent distress.  Nursing staff stated that she has done

very well with the IV Ativan.  When I saw her this morning, she was in fact

sleeping comfortably, maintaining her oxygen saturations 99% on 3-1/2 liters of

oxygen by nasal cannula.



OBJECTIVE:

GENERAL:  On examining her, she was lethargic.  No jaundice or cyanosis.  No

lymphadenopathy, no thyromegaly.  No jugular venous distention.  Mild bilateral

lower limb edema.

VITAL SIGNS:  Her heart rate was 110, blood pressure was 164/95, temperature was

98, respiratory rate was 21 and oxygen saturation was 99%.

HEAD, EYES, EARS, NOSE AND THROAT:  Showed normocephalic, atraumatic.

NECK:  Supple.

HEART:  Showed normal first and second heart sounds.  No gallop, rub or murmur.

CHEST:  Showed central trachea, equal reduced expansion, reduced air entry,

vesicular breath sounds, very few scattered rhonchi.

ABDOMEN:  Slightly distended, soft, nontender.

NEUROLOGIC:  She was sleepy, but arousable.  All cranial nerves intact.  She

moves extremities without difficulty, although she becomes extremely short of

breath with minimal exertion.



Her intake was 600, output was 1250.



LABORATORY DATA:  Her most recent lab work showed a white cell count 9700,

hemoglobin 11.8, hematocrit 35.5, MCV 92, and platelet count 176,000.  Her

chemistry showed a serum sodium 134, potassium 4.9, chloride 90, bicarbonate 45,

BUN 13, creatinine 0.6, estimated GFR was 102 mL per minute.  Her glucose was

241, calcium was 8.7.  Total bilirubin, AST, ALT, alkaline phosphatase were

normal.  Total protein was 6.4, albumin 2.8.



ASSESSMENT:  Acute-on-chronic hypercapnic hypoxic respiratory failure, acute

exacerbation of chronic obstructive pulmonary disease, tobacco dependence,

severe anxiety disorder, hypertension and multiple allergies.



PLAN:  Continue with bedtime BiPAP as needed.  The patient seems to be stable. 

The patient can be transferred to a monitored bed upstairs.

 



______________________________

ALEAH KELLY MD



DR:  ALANNA/katharine  JOB#:  [id number] / [id number]

DD:  12/29/2018 07:50  DT:  12/29/2018 22:11

## 2018-12-30 VITALS — DIASTOLIC BLOOD PRESSURE: 104 MMHG | SYSTOLIC BLOOD PRESSURE: 161 MMHG

## 2018-12-30 VITALS — DIASTOLIC BLOOD PRESSURE: 90 MMHG | SYSTOLIC BLOOD PRESSURE: 151 MMHG

## 2018-12-30 VITALS — DIASTOLIC BLOOD PRESSURE: 92 MMHG | SYSTOLIC BLOOD PRESSURE: 148 MMHG

## 2018-12-30 VITALS — DIASTOLIC BLOOD PRESSURE: 95 MMHG | SYSTOLIC BLOOD PRESSURE: 168 MMHG

## 2018-12-30 VITALS — DIASTOLIC BLOOD PRESSURE: 68 MMHG | SYSTOLIC BLOOD PRESSURE: 134 MMHG

## 2018-12-30 VITALS — SYSTOLIC BLOOD PRESSURE: 165 MMHG | DIASTOLIC BLOOD PRESSURE: 91 MMHG

## 2018-12-30 VITALS — DIASTOLIC BLOOD PRESSURE: 97 MMHG | SYSTOLIC BLOOD PRESSURE: 137 MMHG

## 2018-12-30 VITALS — SYSTOLIC BLOOD PRESSURE: 111 MMHG | DIASTOLIC BLOOD PRESSURE: 75 MMHG

## 2018-12-30 VITALS — SYSTOLIC BLOOD PRESSURE: 97 MMHG | DIASTOLIC BLOOD PRESSURE: 63 MMHG

## 2018-12-30 VITALS — SYSTOLIC BLOOD PRESSURE: 128 MMHG | DIASTOLIC BLOOD PRESSURE: 90 MMHG

## 2018-12-30 VITALS — DIASTOLIC BLOOD PRESSURE: 71 MMHG | SYSTOLIC BLOOD PRESSURE: 139 MMHG

## 2018-12-30 VITALS — DIASTOLIC BLOOD PRESSURE: 67 MMHG | SYSTOLIC BLOOD PRESSURE: 108 MMHG

## 2018-12-30 VITALS — DIASTOLIC BLOOD PRESSURE: 86 MMHG | SYSTOLIC BLOOD PRESSURE: 145 MMHG

## 2018-12-30 VITALS — SYSTOLIC BLOOD PRESSURE: 134 MMHG | DIASTOLIC BLOOD PRESSURE: 68 MMHG

## 2018-12-30 VITALS — SYSTOLIC BLOOD PRESSURE: 161 MMHG | DIASTOLIC BLOOD PRESSURE: 85 MMHG

## 2018-12-30 VITALS — SYSTOLIC BLOOD PRESSURE: 151 MMHG | DIASTOLIC BLOOD PRESSURE: 102 MMHG

## 2018-12-30 VITALS — DIASTOLIC BLOOD PRESSURE: 86 MMHG | SYSTOLIC BLOOD PRESSURE: 147 MMHG

## 2018-12-30 VITALS — SYSTOLIC BLOOD PRESSURE: 140 MMHG | DIASTOLIC BLOOD PRESSURE: 85 MMHG

## 2018-12-30 VITALS — SYSTOLIC BLOOD PRESSURE: 131 MMHG | DIASTOLIC BLOOD PRESSURE: 82 MMHG

## 2018-12-30 VITALS — SYSTOLIC BLOOD PRESSURE: 119 MMHG | DIASTOLIC BLOOD PRESSURE: 77 MMHG

## 2018-12-30 VITALS — SYSTOLIC BLOOD PRESSURE: 153 MMHG | DIASTOLIC BLOOD PRESSURE: 91 MMHG

## 2018-12-30 VITALS — SYSTOLIC BLOOD PRESSURE: 144 MMHG | DIASTOLIC BLOOD PRESSURE: 95 MMHG

## 2018-12-30 LAB
ALBUMIN SERPL-MCNC: 2.4 G/DL (ref 3.4–5)
ALBUMIN/GLOB SERPL: 0.7 {RATIO} (ref 1–1.7)
ALP SERPL-CCNC: 67 U/L (ref 46–116)
ALT SERPL-CCNC: 24 U/L (ref 14–59)
ANION GAP SERPL CALC-SCNC: 2 MMOL/L (ref 6–14)
AST SERPL-CCNC: 12 U/L (ref 15–37)
BASE EXCESS ABG: 17 MMOL/L (ref -3–3)
BILIRUB SERPL-MCNC: 0.3 MG/DL (ref 0.2–1)
BUN SERPL-MCNC: 22 MG/DL (ref 7–20)
BUN/CREAT SERPL: 44 (ref 6–20)
CALCIUM SERPL-MCNC: 8.9 MG/DL (ref 8.5–10.1)
CHLORIDE SERPL-SCNC: 92 MMOL/L (ref 98–107)
CO2 SERPL-SCNC: 44 MMOL/L (ref 21–32)
CREAT SERPL-MCNC: 0.5 MG/DL (ref 0.6–1)
ERYTHROCYTE [DISTWIDTH] IN BLOOD BY AUTOMATED COUNT: 13.8 % (ref 11.5–14.5)
GFR SERPLBLD BASED ON 1.73 SQ M-ARVRAT: 126.3 ML/MIN
GLOBULIN SER-MCNC: 3.3 G/DL (ref 2.2–3.8)
GLUCOSE SERPL-MCNC: 184 MG/DL (ref 70–99)
HCO3 BLDA-SCNC: 45 MMOL/L (ref 21–28)
HCT VFR BLD CALC: 37 % (ref 36–47)
HGB BLD-MCNC: 12.5 G/DL (ref 12–15.5)
INSPIRATION SETTING TIME VENT: 35
MCH RBC QN AUTO: 31 PG (ref 25–35)
MCHC RBC AUTO-ENTMCNC: 34 G/DL (ref 31–37)
MCV RBC AUTO: 91 FL (ref 79–100)
PCO2 BLDA: 67 MMHG (ref 35–46)
PLATELET # BLD AUTO: 184 X10^3/UL (ref 140–400)
PO2 BLDA: 57 MMHG (ref 65–108)
POTASSIUM SERPL-SCNC: 4.4 MMOL/L (ref 3.5–5.1)
PROT SERPL-MCNC: 5.7 G/DL (ref 6.4–8.2)
RBC # BLD AUTO: 4.07 X10^6/UL (ref 3.5–5.4)
SAO2 % BLDA: 90 % (ref 92–99)
SODIUM SERPL-SCNC: 138 MMOL/L (ref 136–145)
WBC # BLD AUTO: 9.6 X10^3/UL (ref 4–11)

## 2018-12-30 PROCEDURE — 5A09357 ASSISTANCE WITH RESPIRATORY VENTILATION, LESS THAN 24 CONSECUTIVE HOURS, CONTINUOUS POSITIVE AIRWAY PRESSURE: ICD-10-PCS

## 2018-12-30 RX ADMIN — METHYLPREDNISOLONE SODIUM SUCCINATE SCH MG: 40 INJECTION, POWDER, FOR SOLUTION INTRAMUSCULAR; INTRAVENOUS at 21:19

## 2018-12-30 RX ADMIN — Medication SCH CAP: at 21:00

## 2018-12-30 RX ADMIN — METHYLPREDNISOLONE SODIUM SUCCINATE SCH MG: 40 INJECTION, POWDER, FOR SOLUTION INTRAMUSCULAR; INTRAVENOUS at 06:10

## 2018-12-30 RX ADMIN — IPRATROPIUM BROMIDE AND ALBUTEROL SULFATE SCH ML: .5; 3 SOLUTION RESPIRATORY (INHALATION) at 16:17

## 2018-12-30 RX ADMIN — IPRATROPIUM BROMIDE AND ALBUTEROL SULFATE SCH ML: .5; 3 SOLUTION RESPIRATORY (INHALATION) at 09:25

## 2018-12-30 RX ADMIN — HEPARIN SODIUM SCH UNIT: 5000 INJECTION, SOLUTION INTRAVENOUS; SUBCUTANEOUS at 06:00

## 2018-12-30 RX ADMIN — HEPARIN SODIUM SCH UNIT: 5000 INJECTION, SOLUTION INTRAVENOUS; SUBCUTANEOUS at 14:23

## 2018-12-30 RX ADMIN — IPRATROPIUM BROMIDE AND ALBUTEROL SULFATE SCH ML: .5; 3 SOLUTION RESPIRATORY (INHALATION) at 12:25

## 2018-12-30 RX ADMIN — METHYLPREDNISOLONE SODIUM SUCCINATE SCH MG: 40 INJECTION, POWDER, FOR SOLUTION INTRAMUSCULAR; INTRAVENOUS at 14:22

## 2018-12-30 RX ADMIN — HEPARIN SODIUM SCH UNIT: 5000 INJECTION, SOLUTION INTRAVENOUS; SUBCUTANEOUS at 21:19

## 2018-12-30 RX ADMIN — IPRATROPIUM BROMIDE AND ALBUTEROL SULFATE SCH ML: .5; 3 SOLUTION RESPIRATORY (INHALATION) at 20:42

## 2018-12-30 NOTE — PDOC
PULMONARY PROGRESS NOTES


Subjective


PT ON BIPAP


OFF BIPAP DID NOT DO WELL ON NC


Vitals





Vital Signs








  Date Time  Temp Pulse Resp B/P (MAP) Pulse Ox O2 Delivery O2 Flow Rate FiO2


 


12/30/18 10:14  112 22 97/63 (74) 96 BiPAP/CPAP  


 


12/30/18 08:22 97.3       





 97.3       


 


12/30/18 08:15       3.0 








General:  Alert


Lungs:  Clear


Cardiovascular:  S1, S2


Abdomen:  Soft, Non-tender


Neuro Exam:  Alert


Extremities:  No Edema


Skin:  Warm


Labs





Laboratory Tests








Test


 12/29/18


08:25 12/29/18


13:20 12/30/18


04:10 12/30/18


09:25


 


O2 Saturation 95 % (92-99)  93 % (92-99)   90 % (92-99) 


 


Arterial Blood pH


 7.20


(7.35-7.45) 7.38


(7.35-7.45) 


 7.44


(7.35-7.45)


 


Arterial Blood pCO2 at


Patient Temp > 155 mmHg


(35-46) 88 mmHg


(35-46) 


 67 mmHg


(35-46)


 


Arterial Blood pO2 at Patient


Temp 92 mmHg


() 66 mmHg


() 


 57 mmHg


()


 


Arterial Blood HCO3


 60 mmol/L


(21-28) 51 mmol/L


(21-28) 


 45 mmol/L


(21-28)


 


Arterial Blood Base Excess


 23 mmol/L


(-3-3) 21 mmol/L


(-3-3) 


 17 mmol/L


(-3-3)


 


Oxyhemoglobin 93.7 %    


 


Methemoglobin


 0.6 %


(0.0-1.9) 


 


 





 


Carbon Monoxide, Quantitative


 0.8 %


(0.0-1.9) 


 


 





 


FiO2 36  30   35 


 


White Blood Count


 


 


 9.6 x10^3/uL


(4.0-11.0) 





 


Red Blood Count


 


 


 4.07 x10^6/uL


(3.50-5.40) 





 


Hemoglobin


 


 


 12.5 g/dL


(12.0-15.5) 





 


Hematocrit


 


 


 37.0 %


(36.0-47.0) 





 


Mean Corpuscular Volume   91 fL ()  


 


Mean Corpuscular Hemoglobin   31 pg (25-35)  


 


Mean Corpuscular Hemoglobin


Concent 


 


 34 g/dL


(31-37) 





 


Red Cell Distribution Width


 


 


 13.8 %


(11.5-14.5) 





 


Platelet Count


 


 


 184 x10^3/uL


(140-400) 





 


Sodium Level


 


 


 138 mmol/L


(136-145) 





 


Potassium Level


 


 


 4.4 mmol/L


(3.5-5.1) 





 


Chloride Level


 


 


 92 mmol/L


() 





 


Carbon Dioxide Level


 


 


 44 mmol/L


(21-32) 





 


Anion Gap   2 (6-14)  


 


Blood Urea Nitrogen


 


 


 22 mg/dL


(7-20) 





 


Creatinine


 


 


 0.5 mg/dL


(0.6-1.0) 





 


Estimated GFR


(Cockcroft-Gault) 


 


 126.3 


 





 


BUN/Creatinine Ratio   44 (6-20)  


 


Glucose Level


 


 


 184 mg/dL


(70-99) 





 


Calcium Level


 


 


 8.9 mg/dL


(8.5-10.1) 





 


Total Bilirubin


 


 


 0.3 mg/dL


(0.2-1.0) 





 


Aspartate Amino Transf


(AST/SGOT) 


 


 12 U/L (15-37) 


 





 


Alanine Aminotransferase


(ALT/SGPT) 


 


 24 U/L (14-59) 


 





 


Alkaline Phosphatase


 


 


 67 U/L


() 





 


Total Protein


 


 


 5.7 g/dL


(6.4-8.2) 





 


Albumin


 


 


 2.4 g/dL


(3.4-5.0) 





 


Albumin/Globulin Ratio   0.7 (1.0-1.7)  








Laboratory Tests








Test


 12/29/18


13:20 12/30/18


04:10 12/30/18


09:25


 


O2 Saturation 93 % (92-99)   90 % (92-99) 


 


Arterial Blood pH


 7.38


(7.35-7.45) 


 7.44


(7.35-7.45)


 


Arterial Blood pCO2 at


Patient Temp 88 mmHg


(35-46) 


 67 mmHg


(35-46)


 


Arterial Blood pO2 at Patient


Temp 66 mmHg


() 


 57 mmHg


()


 


Arterial Blood HCO3


 51 mmol/L


(21-28) 


 45 mmol/L


(21-28)


 


Arterial Blood Base Excess


 21 mmol/L


(-3-3) 


 17 mmol/L


(-3-3)


 


FiO2 30   35 


 


White Blood Count


 


 9.6 x10^3/uL


(4.0-11.0) 





 


Red Blood Count


 


 4.07 x10^6/uL


(3.50-5.40) 





 


Hemoglobin


 


 12.5 g/dL


(12.0-15.5) 





 


Hematocrit


 


 37.0 %


(36.0-47.0) 





 


Mean Corpuscular Volume  91 fL ()  


 


Mean Corpuscular Hemoglobin  31 pg (25-35)  


 


Mean Corpuscular Hemoglobin


Concent 


 34 g/dL


(31-37) 





 


Red Cell Distribution Width


 


 13.8 %


(11.5-14.5) 





 


Platelet Count


 


 184 x10^3/uL


(140-400) 





 


Sodium Level


 


 138 mmol/L


(136-145) 





 


Potassium Level


 


 4.4 mmol/L


(3.5-5.1) 





 


Chloride Level


 


 92 mmol/L


() 





 


Carbon Dioxide Level


 


 44 mmol/L


(21-32) 





 


Anion Gap  2 (6-14)  


 


Blood Urea Nitrogen


 


 22 mg/dL


(7-20) 





 


Creatinine


 


 0.5 mg/dL


(0.6-1.0) 





 


Estimated GFR


(Cockcroft-Gault) 


 126.3 


 





 


BUN/Creatinine Ratio  44 (6-20)  


 


Glucose Level


 


 184 mg/dL


(70-99) 





 


Calcium Level


 


 8.9 mg/dL


(8.5-10.1) 





 


Total Bilirubin


 


 0.3 mg/dL


(0.2-1.0) 





 


Aspartate Amino Transf


(AST/SGOT) 


 12 U/L (15-37) 


 





 


Alanine Aminotransferase


(ALT/SGPT) 


 24 U/L (14-59) 


 





 


Alkaline Phosphatase


 


 67 U/L


() 





 


Total Protein


 


 5.7 g/dL


(6.4-8.2) 





 


Albumin


 


 2.4 g/dL


(3.4-5.0) 





 


Albumin/Globulin Ratio  0.7 (1.0-1.7)  








Medications





Active Scripts








 Medications  Dose


 Route/Sig


 Max Daily Dose Days Date Category


 


 Protonix


  (Pantoprazole


 Sodium) 20 Mg


 Tablet.dr  40 Mg


 PO DAILY


   12/26/18 Reported


 


 Xopenex


  (Levalbuterol


 Hcl) 1.25 Mg/3 Ml


 Vial.neb  1 Vial


 NEB QID PRN


   12/26/18 Reported


 


 Amlodipine


 Besylate 2.5 Mg


 Tablet  2.5 Mg


 PO DAILY


   12/26/18 Reported











Impression


.


IMPRESSION:


1.  Acute on chronic hypercapnic hypoxemic respiratory failure.


2.  Acute exacerbation of chronic obstructive pulmonary disease.


3.  Tobacco dependence.


4.  Multiple allergies.


5.  Severe anxiety disorder, nonspecific.


6.  Hypertension.














CT CHEST


 Impression:


Linear scarring or atelectasis involving the posterior right lung basilar 


region.


 


Diffuse emphysematous involvement of the lung fields.


 


No suspicious consolidations. No suspicious interstitial process.





Plan


.


D/W RN WILL KEEP IN ICU


PT RESP STATUS IS TENUOUS 


BIPAP OFF FOR FEEDINGS


PT NEEDS TIME TO IMPROVE


DOXY


STEROIDS


PALLIATIVE CARE TO FOLLOW 


I HAD DISCUSSION WITH PT AND DAUGHTER THAT WORKS AT [facility], PT WISHES TO BE 

INTUBATED IF SHE DETERIORATES,  FOR A SHORT PERIOD OF TIME, 7-10, IF NO 

IMPROVEMENT SHE WISH TO D/C SUPPORT AND ALLOW NATURAL DEATH











RAYO ROBERT MD Dec 30, 2018 11:10

## 2018-12-30 NOTE — NUR
Bipap was taken off patient at 0830 to eat breakfast. Pt was tolerating with a sat of 95% 
with NC at 3L NC. Once patient starting eating, the patient became very anxious and started 
crying and stating, "help me." Pt was placed on the bedpan numerous times with just 
flatulence. Pt became so anxious, heart was increasing to 120's, sat was decreasing to a low 
of 77%, and BP increasing to 160/100. At first, patient refused for bipap to be placed back 
on. Within a few minutes patient allowed the nurse to place bipap back on. Ativan 
administered as ordered. Pt resting. ABG being drawn. Will continue to monitor.

## 2018-12-31 VITALS — SYSTOLIC BLOOD PRESSURE: 145 MMHG | DIASTOLIC BLOOD PRESSURE: 92 MMHG

## 2018-12-31 VITALS — SYSTOLIC BLOOD PRESSURE: 96 MMHG | DIASTOLIC BLOOD PRESSURE: 62 MMHG

## 2018-12-31 VITALS — DIASTOLIC BLOOD PRESSURE: 77 MMHG | SYSTOLIC BLOOD PRESSURE: 124 MMHG

## 2018-12-31 VITALS — SYSTOLIC BLOOD PRESSURE: 153 MMHG | DIASTOLIC BLOOD PRESSURE: 70 MMHG

## 2018-12-31 VITALS — DIASTOLIC BLOOD PRESSURE: 83 MMHG | SYSTOLIC BLOOD PRESSURE: 143 MMHG

## 2018-12-31 VITALS — SYSTOLIC BLOOD PRESSURE: 142 MMHG | DIASTOLIC BLOOD PRESSURE: 91 MMHG

## 2018-12-31 VITALS — DIASTOLIC BLOOD PRESSURE: 71 MMHG | SYSTOLIC BLOOD PRESSURE: 118 MMHG

## 2018-12-31 VITALS — DIASTOLIC BLOOD PRESSURE: 76 MMHG | SYSTOLIC BLOOD PRESSURE: 143 MMHG

## 2018-12-31 VITALS — DIASTOLIC BLOOD PRESSURE: 100 MMHG | SYSTOLIC BLOOD PRESSURE: 159 MMHG

## 2018-12-31 VITALS — DIASTOLIC BLOOD PRESSURE: 81 MMHG | SYSTOLIC BLOOD PRESSURE: 129 MMHG

## 2018-12-31 VITALS — DIASTOLIC BLOOD PRESSURE: 106 MMHG | SYSTOLIC BLOOD PRESSURE: 149 MMHG

## 2018-12-31 VITALS — SYSTOLIC BLOOD PRESSURE: 162 MMHG | DIASTOLIC BLOOD PRESSURE: 87 MMHG

## 2018-12-31 VITALS — SYSTOLIC BLOOD PRESSURE: 143 MMHG | DIASTOLIC BLOOD PRESSURE: 85 MMHG

## 2018-12-31 VITALS — DIASTOLIC BLOOD PRESSURE: 82 MMHG | SYSTOLIC BLOOD PRESSURE: 152 MMHG

## 2018-12-31 VITALS — SYSTOLIC BLOOD PRESSURE: 98 MMHG | DIASTOLIC BLOOD PRESSURE: 78 MMHG

## 2018-12-31 VITALS — SYSTOLIC BLOOD PRESSURE: 166 MMHG | DIASTOLIC BLOOD PRESSURE: 99 MMHG

## 2018-12-31 VITALS — SYSTOLIC BLOOD PRESSURE: 153 MMHG | DIASTOLIC BLOOD PRESSURE: 73 MMHG

## 2018-12-31 VITALS — SYSTOLIC BLOOD PRESSURE: 132 MMHG | DIASTOLIC BLOOD PRESSURE: 78 MMHG

## 2018-12-31 VITALS — SYSTOLIC BLOOD PRESSURE: 154 MMHG | DIASTOLIC BLOOD PRESSURE: 84 MMHG

## 2018-12-31 VITALS — SYSTOLIC BLOOD PRESSURE: 148 MMHG | DIASTOLIC BLOOD PRESSURE: 92 MMHG

## 2018-12-31 VITALS — DIASTOLIC BLOOD PRESSURE: 87 MMHG | SYSTOLIC BLOOD PRESSURE: 147 MMHG

## 2018-12-31 VITALS — DIASTOLIC BLOOD PRESSURE: 44 MMHG | SYSTOLIC BLOOD PRESSURE: 94 MMHG

## 2018-12-31 VITALS — DIASTOLIC BLOOD PRESSURE: 79 MMHG | SYSTOLIC BLOOD PRESSURE: 123 MMHG

## 2018-12-31 VITALS — DIASTOLIC BLOOD PRESSURE: 59 MMHG | SYSTOLIC BLOOD PRESSURE: 118 MMHG

## 2018-12-31 LAB
BASE EXCESS ABG: 11 MMOL/L (ref -3–3)
HCO3 BLDA-SCNC: 39 MMOL/L (ref 21–28)
INSPIRATION SETTING TIME VENT: (no result)
PCO2 BLDA: 65 MMHG (ref 35–46)
PO2 BLDA: 62 MMHG (ref 65–108)
SAO2 % BLDA: 91 % (ref 92–99)

## 2018-12-31 PROCEDURE — 5A09357 ASSISTANCE WITH RESPIRATORY VENTILATION, LESS THAN 24 CONSECUTIVE HOURS, CONTINUOUS POSITIVE AIRWAY PRESSURE: ICD-10-PCS

## 2018-12-31 RX ADMIN — IPRATROPIUM BROMIDE AND ALBUTEROL SULFATE SCH ML: .5; 3 SOLUTION RESPIRATORY (INHALATION) at 20:27

## 2018-12-31 RX ADMIN — IPRATROPIUM BROMIDE AND ALBUTEROL SULFATE SCH ML: .5; 3 SOLUTION RESPIRATORY (INHALATION) at 08:19

## 2018-12-31 RX ADMIN — HEPARIN SODIUM SCH UNIT: 5000 INJECTION, SOLUTION INTRAVENOUS; SUBCUTANEOUS at 05:38

## 2018-12-31 RX ADMIN — Medication SCH CAP: at 21:00

## 2018-12-31 RX ADMIN — IPRATROPIUM BROMIDE AND ALBUTEROL SULFATE SCH ML: .5; 3 SOLUTION RESPIRATORY (INHALATION) at 14:58

## 2018-12-31 RX ADMIN — HEPARIN SODIUM SCH UNIT: 5000 INJECTION, SOLUTION INTRAVENOUS; SUBCUTANEOUS at 15:46

## 2018-12-31 RX ADMIN — METHYLPREDNISOLONE SODIUM SUCCINATE SCH MG: 40 INJECTION, POWDER, FOR SOLUTION INTRAMUSCULAR; INTRAVENOUS at 05:38

## 2018-12-31 RX ADMIN — IPRATROPIUM BROMIDE AND ALBUTEROL SULFATE SCH ML: .5; 3 SOLUTION RESPIRATORY (INHALATION) at 11:19

## 2018-12-31 RX ADMIN — Medication SCH CAP: at 09:20

## 2018-12-31 RX ADMIN — METHYLPREDNISOLONE SODIUM SUCCINATE SCH MG: 40 INJECTION, POWDER, FOR SOLUTION INTRAMUSCULAR; INTRAVENOUS at 20:44

## 2018-12-31 RX ADMIN — HEPARIN SODIUM SCH UNIT: 5000 INJECTION, SOLUTION INTRAVENOUS; SUBCUTANEOUS at 22:00

## 2018-12-31 NOTE — PN
DATE:  12/30/2018



SUBJECTIVE:  The patient is resting slightly propped up in bed, in no apparent

distress.  She is on BiPAP machine, .  Her oxygen saturation is 100% on

FiO2 of 35%.  She apparently did very well overnight, stayed mostly on the BiPAP

machine except for a few times when she ate and drank.



OBJECTIVE:

GENERAL:  When I examined her, she looked well and was clearly in no apparent

respiratory distress.  She was, if anything, plethoric.  There is no jaundice,

cyanosis or thyromegaly.  No jugular venous distension.  No limb edema.

VITAL SIGNS:  Her heart rate was 88, blood pressure was 144/95, temperature was

98.1, respiratory rate 23 and oxygen saturation was 100% on BiPAP machine.

HEAD, EYES, EARS, NOSE AND THROAT:  Shows normocephalic, atraumatic.

NECK:  Supple.

HEART:  Showed normal first and second heart sounds with no gallop, rub or

murmur.

CHEST:  Shows central trachea, equally reduced expansion, reduced air entry,

vesicular sounds.  No crepitation or rhonchi.

ABDOMEN:  Distended, soft, nontender.

NEUROLOGIC:  She is sleepy, but arousable.  All cranial nerves are intact.  She

moves extremities without difficulty.  She is mostly bed bound.



Her intake over the last 24 hours was 680, output was 700.



LABORATORY DATA:  As of this morning, her white cell count was 9600, hemoglobin

12.5, hematocrit 37, MCV 91 and platelet count of 184,000.  Serum sodium was

138, potassium 4.4, chloride 92, bicarbonate 44, anion gap of 2, BUN 22,

creatinine 0.5, estimated GFR was 126 mL per minute.  Her glucose 184, calcium

was 8.9.  Total bilirubin, AST, ALT, alkaline phosphatase were normal.  Total

protein was 5.7, albumin 2.4.  As of yesterday morning after 2-1/2 hours off the

BiPAP, her pH was 7.21, pCO2 was 155.



ASSESSMENT:

1.  Acute on chronic hypercapnic hypoxic respiratory failure.

2.  Acute exacerbation of chronic obstructive pulmonary disease.

3.  Tobacco dependence.

4.  Severe anxiety disorder.

5.  Hypertension.

6.  Multiple allergies.

7.  Severe protein calorie malnutrition.



PLAN:  To obviously continue with this, but we probably have to have a

discussion tomorrow as to where do we go from here.  I believe the patient needs

either to be intubated and has a tracheostomy tube and be sent to [facility] and/or

[facility] or consider hospice care.

 



______________________________

ALEAH KELLY MD



DR:  ALANNA/katharine  JOB#:  [id number] / [id number]

DD:  12/30/2018 07:18  DT:  12/31/2018 00:03

## 2018-12-31 NOTE — NUR
PATIENT'S IV IS OUTDATED, FROM 12/27. RN EXPLAINED TO PATIENT THE NEED TO START NEW IV, 
PATIENT REFUSING IV INSERTION AT THIS TIME 

-------------------------------------------------------------------------------

Addendum: 12/31/18 at 2318 by REI TAO RN

-------------------------------------------------------------------------------

Amended: Links added.

## 2018-12-31 NOTE — PDOC
PULMONARY PROGRESS NOTES


Subjective


Had been on bipap 24 hrs/day and did not tolerate low flow oxygen.  This 

morning she was switched to low flow oxyge, off bipap, and tolerating well.  

she feels as if she is slowly improving.  Minimal cough.  On systemic steroids 

and inhaled beta 2 agonists and anticholinergics.


Vitals





Vital Signs








  Date Time  Temp Pulse Resp B/P (MAP) Pulse Ox O2 Delivery O2 Flow Rate FiO2


 


12/31/18 08:19     97 Nasal Cannula 5.0 


 


12/31/18 08:00 98.4 96 26 153/73 (99)    





 98.4       








General:  Alert


Lungs:  Clear


Cardiovascular:  S1, S2


Abdomen:  Soft, Non-tender


Neuro Exam:  Alert


Extremities:  No Edema


Skin:  Warm


Labs





Laboratory Tests








Test


 12/29/18


13:20 12/30/18


04:10 12/30/18


09:25 12/31/18


08:50


 


O2 Saturation 93 % (92-99)   90 % (92-99)  91 % (92-99) 


 


Arterial Blood pH


 7.38


(7.35-7.45) 


 7.44


(7.35-7.45) 7.39


(7.35-7.45)


 


Arterial Blood pCO2 at


Patient Temp 88 mmHg


(35-46) 


 67 mmHg


(35-46) 65 mmHg


(35-46)


 


Arterial Blood pO2 at Patient


Temp 66 mmHg


() 


 57 mmHg


() 62 mmHg


()


 


Arterial Blood HCO3


 51 mmol/L


(21-28) 


 45 mmol/L


(21-28) 39 mmol/L


(21-28)


 


Arterial Blood Base Excess


 21 mmol/L


(-3-3) 


 17 mmol/L


(-3-3) 11 mmol/L


(-3-3)


 


FiO2 30   35  40% nc 


 


White Blood Count


 


 9.6 x10^3/uL


(4.0-11.0) 


 





 


Red Blood Count


 


 4.07 x10^6/uL


(3.50-5.40) 


 





 


Hemoglobin


 


 12.5 g/dL


(12.0-15.5) 


 





 


Hematocrit


 


 37.0 %


(36.0-47.0) 


 





 


Mean Corpuscular Volume  91 fL ()   


 


Mean Corpuscular Hemoglobin  31 pg (25-35)   


 


Mean Corpuscular Hemoglobin


Concent 


 34 g/dL


(31-37) 


 





 


Red Cell Distribution Width


 


 13.8 %


(11.5-14.5) 


 





 


Platelet Count


 


 184 x10^3/uL


(140-400) 


 





 


Sodium Level


 


 138 mmol/L


(136-145) 


 





 


Potassium Level


 


 4.4 mmol/L


(3.5-5.1) 


 





 


Chloride Level


 


 92 mmol/L


() 


 





 


Carbon Dioxide Level


 


 44 mmol/L


(21-32) 


 





 


Anion Gap  2 (6-14)   


 


Blood Urea Nitrogen


 


 22 mg/dL


(7-20) 


 





 


Creatinine


 


 0.5 mg/dL


(0.6-1.0) 


 





 


Estimated GFR


(Cockcroft-Gault) 


 126.3 


 


 





 


BUN/Creatinine Ratio  44 (6-20)   


 


Glucose Level


 


 184 mg/dL


(70-99) 


 





 


Calcium Level


 


 8.9 mg/dL


(8.5-10.1) 


 





 


Total Bilirubin


 


 0.3 mg/dL


(0.2-1.0) 


 





 


Aspartate Amino Transf


(AST/SGOT) 


 12 U/L (15-37) 


 


 





 


Alanine Aminotransferase


(ALT/SGPT) 


 24 U/L (14-59) 


 


 





 


Alkaline Phosphatase


 


 67 U/L


() 


 





 


Total Protein


 


 5.7 g/dL


(6.4-8.2) 


 





 


Albumin


 


 2.4 g/dL


(3.4-5.0) 


 





 


Albumin/Globulin Ratio  0.7 (1.0-1.7)   








Laboratory Tests








Test


 12/31/18


08:50


 


O2 Saturation 91 % (92-99) 


 


Arterial Blood pH


 7.39


(7.35-7.45)


 


Arterial Blood pCO2 at


Patient Temp 65 mmHg


(35-46)


 


Arterial Blood pO2 at Patient


Temp 62 mmHg


()


 


Arterial Blood HCO3


 39 mmol/L


(21-28)


 


Arterial Blood Base Excess


 11 mmol/L


(-3-3)


 


FiO2 40% nc 








Medications





Active Scripts








 Medications  Dose


 Route/Sig


 Max Daily Dose Days Date Category


 


 Protonix


  (Pantoprazole


 Sodium) 20 Mg


 Tablet.dr  40 Mg


 PO DAILY


   12/26/18 Reported


 


 Xopenex


  (Levalbuterol


 Hcl) 1.25 Mg/3 Ml


 Vial.neb  1 Vial


 NEB QID PRN


   12/26/18 Reported


 


 Amlodipine


 Besylate 2.5 Mg


 Tablet  2.5 Mg


 PO DAILY


   12/26/18 Reported











Impression


.


IMPRESSION:


1.  Acute on chronic hypercapnic hypoxemic respiratory failure.


2.  Acute exacerbation of chronic obstructive pulmonary disease.


3.  Tobacco dependence.


4.  Multiple allergies.


5.  Severe anxiety disorder, nonspecific.


6.  Hypertension.














CT CHEST


 Impression:


Linear scarring or atelectasis involving the posterior right lung basilar 


region.


 


Diffuse emphysematous involvement of the lung fields.


 


No suspicious consolidations. No suspicious interstitial process.





Plan


.


D/W RN WILL KEEP IN ICU


PT RESP STATUS IS TENUOUS but improving 


low flow oxygen while awake and bipap while sleeping


continue systemic steroids and bronchodilators


antibiotics


PALLIATIVE CARE TO FOLLOW 


PT WISHES TO BE INTUBATED IF SHE DETERIORATES,  FOR A SHORT PERIOD OF TIME, 7-10

, IF NO IMPROVEMENT SHE WISH TO D/C SUPPORT AND ALLOW NATURAL DEATH











BRIGETTE FELIPE MD Dec 31, 2018 10:40

## 2018-12-31 NOTE — NUR
Patient placed on 3lnc this morning at 0800. At 1200 patient oxygen saturation dropped into 
the 70's ,s. Gave 0.5 mg of IV ativan. Placed patient on Bipap at 1230. 12 lead EKG 
showed sinus tach. CXR was negative for anything acute. Dr Linda was called ordered 250ml NS 
bolus. At 1830 patients HR in the 130's. Patient has been on Bipap since 1230. Put patient 
on 3lnc to try to eat dinner. Patient desaturated into the 70's in a matter of 5 min. Placed 
back on Bipap. Will continue to monitor.

## 2018-12-31 NOTE — EKG
[facility]

              [address] [place] [address]

Test Date:    2018               Test Time:    13:52:34

Pat Name:     ADELE SWEENEY         Department:   

Patient ID:   [id number]           Room:         109 1

Gender:       F                        Technician:   [facility]

:          1960               Requested By: ALEHA KELLY

Order Number: [id number]           Reading MD:   Alvin Morales

                                 Measurements

Intervals                              Axis          

Rate:         146                      P:            90

MA:           80                       QRS:          65

QRSD:         88                       T:            64

QT:           274                                    

QTc:          428                                    

                           Interpretive Statements

SINUS TACHYCARDIA

RIGHT ATRIAL ENLARGEMENT

ST & T ABNORMALITY, CONSIDER

INFERIOR ISCHEMIA OR LEFT VENTRICULAR STRAIN

ABNORMAL ECG



Electronically Signed On 2019 17:12:30 CST by Alvin Morales

## 2018-12-31 NOTE — RAD
Single view chest 12/31/2018

 

CLINICAL INDICATION: Shortness of air.

 

COMPARISON: Chest 12/27/2018

 

FINDINGS:

 

Cardiac and mediastinal silhouettes are stable. Hyperexpansion of both 

lungs with scattered areas of pleural-parenchymal scarring. Unchanged 

blunting of the costophrenic angles. Interval increase in bibasilar 

airspace opacities. No pneumothorax.

 

IMPRESSION:

1. Development of patchy bibasilar opacities concerning for pneumonitis or

pneumonia.

2. Emphysema.

 

Electronically signed by: Nicholas Brian MD (12/31/2018 2:50 PM) [id number]

## 2018-12-31 NOTE — NUR
SS following for discharge needs. SS discussed with pt's RN. Pt remains in ICU on BIPAP at 
this time. SS phoned and faxed referral to [facility], [phone]; fax 
[phone]. SS will await acceptance decision and will proceed accordingly.

## 2019-01-01 VITALS — SYSTOLIC BLOOD PRESSURE: 99 MMHG | DIASTOLIC BLOOD PRESSURE: 67 MMHG

## 2019-01-01 VITALS — SYSTOLIC BLOOD PRESSURE: 110 MMHG | DIASTOLIC BLOOD PRESSURE: 71 MMHG

## 2019-01-01 VITALS — SYSTOLIC BLOOD PRESSURE: 99 MMHG | DIASTOLIC BLOOD PRESSURE: 66 MMHG

## 2019-01-01 VITALS — DIASTOLIC BLOOD PRESSURE: 66 MMHG | SYSTOLIC BLOOD PRESSURE: 103 MMHG

## 2019-01-01 VITALS — DIASTOLIC BLOOD PRESSURE: 92 MMHG | SYSTOLIC BLOOD PRESSURE: 147 MMHG

## 2019-01-01 VITALS — SYSTOLIC BLOOD PRESSURE: 128 MMHG | DIASTOLIC BLOOD PRESSURE: 80 MMHG

## 2019-01-01 VITALS — SYSTOLIC BLOOD PRESSURE: 112 MMHG | DIASTOLIC BLOOD PRESSURE: 79 MMHG

## 2019-01-01 VITALS — DIASTOLIC BLOOD PRESSURE: 80 MMHG | SYSTOLIC BLOOD PRESSURE: 136 MMHG

## 2019-01-01 VITALS — DIASTOLIC BLOOD PRESSURE: 69 MMHG | SYSTOLIC BLOOD PRESSURE: 104 MMHG

## 2019-01-01 VITALS — DIASTOLIC BLOOD PRESSURE: 80 MMHG | SYSTOLIC BLOOD PRESSURE: 138 MMHG

## 2019-01-01 VITALS — SYSTOLIC BLOOD PRESSURE: 142 MMHG | DIASTOLIC BLOOD PRESSURE: 82 MMHG

## 2019-01-01 VITALS — SYSTOLIC BLOOD PRESSURE: 100 MMHG | DIASTOLIC BLOOD PRESSURE: 64 MMHG

## 2019-01-01 VITALS — SYSTOLIC BLOOD PRESSURE: 140 MMHG | DIASTOLIC BLOOD PRESSURE: 68 MMHG

## 2019-01-01 VITALS — DIASTOLIC BLOOD PRESSURE: 74 MMHG | SYSTOLIC BLOOD PRESSURE: 122 MMHG

## 2019-01-01 VITALS — SYSTOLIC BLOOD PRESSURE: 139 MMHG | DIASTOLIC BLOOD PRESSURE: 98 MMHG

## 2019-01-01 LAB
BASE EXCESS ABG: 13 MMOL/L (ref -3–3)
HCO3 BLDA-SCNC: 40 MMOL/L (ref 21–28)
INSPIRATION SETTING TIME VENT: 36
PCO2 BLDA: 60 MMHG (ref 35–46)
PO2 BLDA: 60 MMHG (ref 65–108)
SAO2 % BLDA: 91 % (ref 92–99)

## 2019-01-01 PROCEDURE — 5A09357 ASSISTANCE WITH RESPIRATORY VENTILATION, LESS THAN 24 CONSECUTIVE HOURS, CONTINUOUS POSITIVE AIRWAY PRESSURE: ICD-10-PCS

## 2019-01-01 RX ADMIN — HEPARIN SODIUM SCH UNIT: 5000 INJECTION, SOLUTION INTRAVENOUS; SUBCUTANEOUS at 20:54

## 2019-01-01 RX ADMIN — METHYLPREDNISOLONE SODIUM SUCCINATE SCH MG: 40 INJECTION, POWDER, FOR SOLUTION INTRAMUSCULAR; INTRAVENOUS at 20:43

## 2019-01-01 RX ADMIN — IPRATROPIUM BROMIDE AND ALBUTEROL SULFATE SCH ML: .5; 3 SOLUTION RESPIRATORY (INHALATION) at 08:13

## 2019-01-01 RX ADMIN — Medication SCH CAP: at 09:11

## 2019-01-01 RX ADMIN — Medication SCH CAP: at 20:44

## 2019-01-01 RX ADMIN — IPRATROPIUM BROMIDE AND ALBUTEROL SULFATE SCH ML: .5; 3 SOLUTION RESPIRATORY (INHALATION) at 11:19

## 2019-01-01 RX ADMIN — HEPARIN SODIUM SCH UNIT: 5000 INJECTION, SOLUTION INTRAVENOUS; SUBCUTANEOUS at 06:00

## 2019-01-01 RX ADMIN — HEPARIN SODIUM SCH UNIT: 5000 INJECTION, SOLUTION INTRAVENOUS; SUBCUTANEOUS at 13:54

## 2019-01-01 RX ADMIN — METHYLPREDNISOLONE SODIUM SUCCINATE SCH MG: 40 INJECTION, POWDER, FOR SOLUTION INTRAMUSCULAR; INTRAVENOUS at 09:11

## 2019-01-01 RX ADMIN — IPRATROPIUM BROMIDE AND ALBUTEROL SULFATE SCH ML: .5; 3 SOLUTION RESPIRATORY (INHALATION) at 15:27

## 2019-01-01 RX ADMIN — IPRATROPIUM BROMIDE AND ALBUTEROL SULFATE SCH ML: .5; 3 SOLUTION RESPIRATORY (INHALATION) at 19:41

## 2019-01-01 NOTE — NUR
Patient sitting up in the chair with oxygen at 4 lpm nasal cannula. Eating breakfast without 
difficulty.

## 2019-01-01 NOTE — NUR
RT at the bedside. O2 down to 86%. Bipap placed on patient with breathing treatment. Patient 
tearful at this time.

## 2019-01-01 NOTE — PDOC
PULMONARY PROGRESS NOTES


Subjective


Off bipap yesterday but returned to bipap overnight. she feels as if she is 

improving and wants to go without bipap.  Minimal cough.  On systemic steroids 

and inhaled beta 2 agonists and anticholinergics.


Vitals





Vital Signs








  Date Time  Temp Pulse Resp B/P (MAP) Pulse Ox O2 Delivery O2 Flow Rate FiO2


 


1/1/19 09:00  99 28 139/98 (112) 94 Nasal Cannula 4.0 


 


1/1/19 07:00 98.2       





 98.2       








General:  Alert, Oriented X4


Lungs:  Clear, Other (diffusely diminished breath sounds)


Cardiovascular:  S1, S2


Abdomen:  Soft, Non-tender


Neuro Exam:  Alert


Extremities:  No Edema


Skin:  Warm


Labs





Laboratory Tests








Test


 12/31/18


08:50


 


O2 Saturation 91 % (92-99) 


 


Arterial Blood pH


 7.39


(7.35-7.45)


 


Arterial Blood pCO2 at


Patient Temp 65 mmHg


(35-46)


 


Arterial Blood pO2 at Patient


Temp 62 mmHg


()


 


Arterial Blood HCO3


 39 mmol/L


(21-28)


 


Arterial Blood Base Excess


 11 mmol/L


(-3-3)


 


FiO2 40% nc 








Medications





Active Scripts








 Medications  Dose


 Route/Sig


 Max Daily Dose Days Date Category


 


 Protonix


  (Pantoprazole


 Sodium) 20 Mg


 Tablet.dr  40 Mg


 PO DAILY


   12/26/18 Reported


 


 Xopenex


  (Levalbuterol


 Hcl) 1.25 Mg/3 Ml


 Vial.neb  1 Vial


 NEB QID PRN


   12/26/18 Reported


 


 Amlodipine


 Besylate 2.5 Mg


 Tablet  2.5 Mg


 PO DAILY


   12/26/18 Reported











Impression


.


IMPRESSION:


1.  Acute on chronic hypercapnic hypoxemic respiratory failure, improving.


2.  Acute exacerbation of chronic obstructive pulmonary disease.


3.  Tobacco dependence.


4.  Multiple allergies.


5.  Severe anxiety disorder, nonspecific.


6.  Hypertension.














CT CHEST


 Impression:


Linear scarring or atelectasis involving the posterior right lung basilar 


region.


 


Diffuse emphysematous involvement of the lung fields.


 


No suspicious consolidations. No suspicious interstitial process.





Plan


.


Continue low flow oxygen.  will discontinue bipap and observe


continue systemic steroids and bronchodilators


antibiotics





OK to transfer out of ICU from my perspective.





PALLIATIVE CARE TO FOLLOW 


PT WISHES TO BE INTUBATED IF SHE DETERIORATES,  FOR A SHORT PERIOD OF TIME, 7-10

, IF NO IMPROVEMENT SHE WISH TO D/C SUPPORT AND ALLOW NATURAL DEATH











BRIGETTE FELIPE MD Jan 1, 2019 09:49

## 2019-01-01 NOTE — PN
DATE:  12/31/2018



SUBJECTIVE:  The patient is resting, slightly propped up, sleeping comfortably

in no apparent distress.  Nursing staff stated that she keep the BiPAP mask

throughout the night.  Has generally pain.  Has done very well and had an

uneventful night.  On questioning her, she denied any complaint.



OBJECTIVE:

GENERAL:  When I examined her, she looked well and was clearly in no apparent

respiratory distress.  No pallor, jaundice, cyanosis, or thyromegaly.  No

jugular venous distension.  No limb edema.

VITAL SIGNS:  Her heart rate was 82, blood pressure was 129/61, temperature was

97.9, respiratory rate was 24, and oxygen saturation was 92% on 3 liters of

oxygen by nasal cannula.

HEAD, EYES, EARS, NOSE AND THROAT:  Showed normocephalic, atraumatic.

NECK:  Supple.

HEART:  Showed normal first and second heart sounds.  No gallop, rub or murmur.

CHEST:  Clear to auscultation.  No crepitation or rhonchi.

ABDOMEN:  Slightly distended, soft, nontender.  No guarding or rigidity.  No

organomegaly.  All hernial orifices intact.  Bowel sounds normal.

NEUROLOGIC:  She is awake, alert, responding appropriately.



Her intake over the last 24 hours was 420, output was 650.



LABORATORY DATA:  As of yesterday, her white cell count was 9600, hemoglobin

12.5, hematocrit 37, MCV 91, and platelet count of 184,000.  Her chemistry

showed a serum sodium 138, potassium 4.4, chloride 92, bicarbonate 44, anion gap

of 2, BUN 22, creatinine 0.5, estimated GFR was 126 mL per minute.  Her glucose

was 184, calcium was 8.9.  Total bilirubin, AST, ALT, alkaline phosphatase were

normal.  Total protein was 5.7, albumin was 2.4.



ASSESSMENT:

1.  Acute-on-chronic hypoxic hypercapnic respiratory failure.

2.  Acute exacerbation of chronic obstructive pulmonary disease.

3.  Continued tobacco use disorder.

4.  Severe anxiety disorder.

5.  Hypertension.

6.  Multiple allergies.

7.  Severe protein-calorie malnutrition.



PLAN:  Obviously to discuss with the patient and the family the goals of care as

she has either to intubate and tracheostomy tube and sent to [facility]or

[facility] or consult hospice care.

 



______________________________

ALEAH KELLY MD



DR:  ALANNA/katharine  JOB#:  [id number] / [id number]

DD:  12/31/2018 07:20  DT:  01/01/2019 00:56

## 2019-01-01 NOTE — NUR
Patient up in the chair after being in the bed for an hour on oxygen at 4 lpm nasal cannula. 
 Patient tolerated well. Patient placed nasal cannula in mouth because she states that her 
nose is stuffy. Patient short of air at this time.

## 2019-01-01 NOTE — NUR
Dr Rose here to see patient. Wants patient to go without the bipap all night. Ok with 
patient transferring upstairs to [facility].

## 2019-01-01 NOTE — PN
DATE:  01/01/2019



SUBJECTIVE:  The patient is resting, slightly propped up in bed, in no apparent

respiratory distress.  When I saw her, she was awake, alert, on 3 liters oxygen

by nasal cannula and maintaining her oxygen saturation at 92%.  The nursing

staff stated that she did well overnight and has been on BiPAP, maintaining her

oxygen saturation at 97%.  She gets very anxious and her heart rate can go up to

140 at times.



PHYSICAL EXAMINATION:

GENERAL:  When I examined her, she looked well and was clearly in no apparent

respiratory distress.  No pallor, jaundice, cyanosis or thyromegaly.  No jugular

venous distention.  No lower limb edema.

VITAL SIGNS:  Her heart rate was 78, blood pressure was 99/66, temperature was

98.5, respiratory rate 24, and oxygen saturation was 97%.

HEAD, EYES, EARS, NOSE AND THROAT:  Normocephalic, atraumatic.

NECK:  Supple.

CARDIAC:  Normal first and second heart sounds.  No gallop, rub or murmur.

CHEST:  Shows central trachea, equally reduced expansion, reduced air entry,

vesicular sounds.  I could not appreciate any crepitation or rhonchi.

ABDOMEN:  Slightly distended, soft, nontender.  No guarding or rigidity.  No

organomegaly.  All hernial orifice intact.  Bowel sounds normal.

NEUROLOGIC:  She is awake, alert, responding appropriately.  Cranial nerves

intact.  Moves extremities without difficulty, though she is mostly bed bound.



Her intake over the last 24 hours was _____, output was 525.



LABORATORY DATA:  No lab work is available.



ASSESSMENT:

1.  Acute on chronic hypoxic hypercapnic respiratory failure, requiring BiPAP

machine.

2.  Acute exacerbation of chronic obstructive pulmonary disease.

3.  Continued tobacco use disorder.

4.  Severe anxiety disorder.

5.  Hypertension.

6.  Multiple allergies.

7.  Severe protein-calorie malnutrition.



PLAN:  Actually cut down her steroids from 40 mg 3 times a day to 40 mg twice a

day.  Continue with IV steroids, bronchodilators, IV antibiotic.  Apparently,

the patient wants to be intubated only for a short period of time for 7 to 10

days.  If no improvement, she would like to discontinue support and allow

natural death.

 



______________________________

ALEAH KELLY MD



DR:  ALANNA/katharine  JOB#:  [id number] / [id number]

DD:  01/01/2019 07:27  DT:  01/01/2019 21:11

## 2019-01-02 VITALS — SYSTOLIC BLOOD PRESSURE: 154 MMHG | DIASTOLIC BLOOD PRESSURE: 92 MMHG

## 2019-01-02 VITALS — DIASTOLIC BLOOD PRESSURE: 94 MMHG | SYSTOLIC BLOOD PRESSURE: 165 MMHG

## 2019-01-02 VITALS — DIASTOLIC BLOOD PRESSURE: 84 MMHG | SYSTOLIC BLOOD PRESSURE: 159 MMHG

## 2019-01-02 LAB
ALBUMIN SERPL-MCNC: 2.4 G/DL (ref 3.4–5)
ALBUMIN/GLOB SERPL: 0.6 {RATIO} (ref 1–1.7)
ALP SERPL-CCNC: 63 U/L (ref 46–116)
ALT SERPL-CCNC: 25 U/L (ref 14–59)
ANION GAP SERPL CALC-SCNC: 1 MMOL/L (ref 6–14)
AST SERPL-CCNC: 9 U/L (ref 15–37)
BILIRUB SERPL-MCNC: 0.2 MG/DL (ref 0.2–1)
BUN SERPL-MCNC: 9 MG/DL (ref 7–20)
BUN/CREAT SERPL: 18 (ref 6–20)
CALCIUM SERPL-MCNC: 8.7 MG/DL (ref 8.5–10.1)
CHLORIDE SERPL-SCNC: 98 MMOL/L (ref 98–107)
CO2 SERPL-SCNC: 40 MMOL/L (ref 21–32)
CREAT SERPL-MCNC: 0.5 MG/DL (ref 0.6–1)
ERYTHROCYTE [DISTWIDTH] IN BLOOD BY AUTOMATED COUNT: 14.2 % (ref 11.5–14.5)
GFR SERPLBLD BASED ON 1.73 SQ M-ARVRAT: 126.3 ML/MIN
GLOBULIN SER-MCNC: 3.8 G/DL (ref 2.2–3.8)
GLUCOSE SERPL-MCNC: 140 MG/DL (ref 70–99)
HCT VFR BLD CALC: 37.6 % (ref 36–47)
HGB BLD-MCNC: 12.4 G/DL (ref 12–15.5)
MCH RBC QN AUTO: 30 PG (ref 25–35)
MCHC RBC AUTO-ENTMCNC: 33 G/DL (ref 31–37)
MCV RBC AUTO: 91 FL (ref 79–100)
PLATELET # BLD AUTO: 200 X10^3/UL (ref 140–400)
POTASSIUM SERPL-SCNC: 4.5 MMOL/L (ref 3.5–5.1)
PROT SERPL-MCNC: 6.2 G/DL (ref 6.4–8.2)
RBC # BLD AUTO: 4.13 X10^6/UL (ref 3.5–5.4)
SODIUM SERPL-SCNC: 139 MMOL/L (ref 136–145)
WBC # BLD AUTO: 8 X10^3/UL (ref 4–11)

## 2019-01-02 RX ADMIN — IPRATROPIUM BROMIDE AND ALBUTEROL SULFATE SCH ML: .5; 3 SOLUTION RESPIRATORY (INHALATION) at 13:26

## 2019-01-02 RX ADMIN — Medication SCH CAP: at 08:21

## 2019-01-02 RX ADMIN — METHYLPREDNISOLONE SODIUM SUCCINATE SCH MG: 40 INJECTION, POWDER, FOR SOLUTION INTRAMUSCULAR; INTRAVENOUS at 08:22

## 2019-01-02 RX ADMIN — HEPARIN SODIUM SCH UNIT: 5000 INJECTION, SOLUTION INTRAVENOUS; SUBCUTANEOUS at 06:17

## 2019-01-02 RX ADMIN — IPRATROPIUM BROMIDE AND ALBUTEROL SULFATE SCH ML: .5; 3 SOLUTION RESPIRATORY (INHALATION) at 06:08

## 2019-01-02 NOTE — NUR
SS following up with discharge planning. Pt has been accepted at [facility]. 
SS phoned and faxed discharge orders to [facility], [phone]; fax 
[phone]. Pt will discharge today and go to [facility] via [facility] transport, [phone]. Pt choice and rights forms verbally consented to by 
pt's daughter and placed in chart. Pt, pt's daughter, and pt's RN notified.

## 2019-01-02 NOTE — NUR
Discharge Note:



ADELE SWEENEY 2 [facility]



Discharge instructions and discharge home medications reviewed with Patient and a copy 
given. All questions have been answered and understanding verbalized. 



The following instructions and handouts were given: 



Discontinued IV 



Patient discharged to LTAC with transport via wheelchair

## 2019-01-02 NOTE — PDOC
PULMONARY PROGRESS NOTES


Subjective


NOT MORE SOA AND LESS ANXIOUS


Vitals





Vital Signs








  Date Time  Temp Pulse Resp B/P (MAP) Pulse Ox O2 Delivery O2 Flow Rate FiO2


 


1/2/19 08:00      Nasal Cannula 4.0 


 


1/2/19 07:00 98.6 102 20 159/84 (109) 97   





 98.6       








General:  Alert


Lungs:  Clear, Other (diffusely diminished breath sounds)


Cardiovascular:  S1, S2


Abdomen:  Soft, Non-tender


Neuro Exam:  Alert


Extremities:  No Edema


Skin:  Warm


Labs





Laboratory Tests








Test


 1/1/19


09:50


 


O2 Saturation 91 % (92-99) 


 


Arterial Blood pH


 7.44


(7.35-7.45)


 


Arterial Blood pCO2 at


Patient Temp 60 mmHg


(35-46)


 


Arterial Blood pO2 at Patient


Temp 60 mmHg


()


 


Arterial Blood HCO3


 40 mmol/L


(21-28)


 


Arterial Blood Base Excess


 13 mmol/L


(-3-3)


 


FiO2 36 








Laboratory Tests








Test


 1/1/19


09:50


 


O2 Saturation 91 % (92-99) 


 


Arterial Blood pH


 7.44


(7.35-7.45)


 


Arterial Blood pCO2 at


Patient Temp 60 mmHg


(35-46)


 


Arterial Blood pO2 at Patient


Temp 60 mmHg


()


 


Arterial Blood HCO3


 40 mmol/L


(21-28)


 


Arterial Blood Base Excess


 13 mmol/L


(-3-3)


 


FiO2 36 








Medications





Active Scripts








 Medications  Dose


 Route/Sig


 Max Daily Dose Days Date Category


 


 Protonix


  (Pantoprazole


 Sodium) 20 Mg


 Tablet.dr  40 Mg


 PO DAILY


   12/26/18 Reported


 


 Xopenex


  (Levalbuterol


 Hcl) 1.25 Mg/3 Ml


 Vial.neb  1 Vial


 NEB QID PRN


   12/26/18 Reported


 


 Amlodipine


 Besylate 2.5 Mg


 Tablet  2.5 Mg


 PO DAILY


   12/26/18 Reported











Impression


.


IMPRESSION:


1.  Acute on chronic hypercapnic hypoxemic respiratory failure, improving.


2.  Acute exacerbation of chronic obstructive pulmonary disease.


3.  Tobacco dependence.


4.  Multiple allergies.


5.  Severe anxiety disorder, nonspecific.


6.  Hypertension.














CT CHEST


 Impression:


Linear scarring or atelectasis involving the posterior right lung basilar 


region.


 


Diffuse emphysematous involvement of the lung fields.


 


No suspicious consolidations. No suspicious interstitial process.





Plan


.


OK TO D/C


FOLLOW UP IN OFFICE











RAYO ROBERT MD Jan 2, 2019 09:19

## 2019-01-03 NOTE — PN
DATE:  01/02/2019



SUBJECTIVE:  The patient is resting slightly propped up in bed, in no apparent

distress.  She is awake, alert, has been off the BiPAP the whole night.



PHYSICAL EXAMINATION:

GENERAL:  On examining her, she looked well, continued to be somewhat

tachypneic.  There was no pallor, jaundice, cyanosis, or thyromegaly.  No

jugular venous distension.  No lower limb edema.

VITAL SIGNS:  Her heart rate was 114, blood pressure was 138/80, temperature was

98, respiratory rate was 22, and oxygen saturation was 93-96% on 4 liters of

oxygen by nasal cannula.

HEAD, EYES, EARS, NOSE AND THROAT:  Showed normocephalic, atraumatic.

NECK:  Supple.

HEART:  Showed normal first and second heart sounds.  No gallop, rub, or murmur.

CHEST:  Clear to auscultation.  No crepitation or rhonchi.

ABDOMEN:  Slightly distended, soft, nontender.

NEUROLOGIC:  She is awake, alert, responding appropriately.  All cranial nerves

are intact.

EXTREMITIES:  She moves extremities without difficulty.



She quickly desaturates on minimal exertion.



Her intake over the last 24 hours was 1500, output was 200.



LABORATORY DATA:  Her lab work is still pending at the time of this dictation.



ASSESSMENT:

1.  Acute on chronic hypoxic hypercapnic respiratory failure, improving slowly.

2.  Acute exacerbation of chronic obstructive pulmonary disease.

3.  Continued tobacco use disorder.

4.  Severe anxiety disorder.

5.  Hypertension.

6.  Multiple allergies.

7.  Severe protein-calorie malnutrition.



PLAN:  Continue with IV antibiotic.  Continue with IV Solu-Medrol.  Continue

with bronchodilator.  Continue with DVT prophylaxis.  I will order some labs and

we will continue obviously with physical and occupational therapy.

 



______________________________

ALEAH KELLY MD



DR:  ALANNA/katharine  JOB#:  [id number] / [id number]

DD:  01/02/2019 08:33  DT:  01/02/2019 17:37